# Patient Record
Sex: FEMALE | Race: OTHER | HISPANIC OR LATINO | Employment: UNEMPLOYED | URBAN - METROPOLITAN AREA
[De-identification: names, ages, dates, MRNs, and addresses within clinical notes are randomized per-mention and may not be internally consistent; named-entity substitution may affect disease eponyms.]

---

## 2022-04-10 ENCOUNTER — HISTORICAL (OUTPATIENT)
Dept: ADMINISTRATIVE | Facility: HOSPITAL | Age: 62
End: 2022-04-10
Payer: MEDICAID

## 2022-04-28 VITALS — WEIGHT: 136.69 LBS | BODY MASS INDEX: 26.84 KG/M2 | HEIGHT: 60 IN

## 2022-05-11 ENCOUNTER — CLINICAL SUPPORT (OUTPATIENT)
Dept: OPHTHALMOLOGY | Facility: CLINIC | Age: 62
End: 2022-05-11
Payer: MEDICAID

## 2022-05-11 VITALS — WEIGHT: 140 LBS | HEIGHT: 60 IN | BODY MASS INDEX: 27.48 KG/M2

## 2022-05-11 DIAGNOSIS — E11.311 DIABETIC MACULAR EDEMA OF BOTH EYES: Primary | ICD-10-CM

## 2022-05-11 PROCEDURE — 99213 OFFICE O/P EST LOW 20 MIN: CPT | Mod: PBBFAC,PO | Performed by: OPHTHALMOLOGY

## 2022-05-11 PROCEDURE — 67028 INJECTION EYE DRUG: CPT | Mod: PBBFAC,PO | Performed by: PHYSICIAN ASSISTANT

## 2022-05-11 PROCEDURE — 92134 CPTRZ OPH DX IMG PST SGM RTA: CPT | Mod: PBBFAC,PO | Performed by: OPHTHALMOLOGY

## 2022-05-11 RX ORDER — GLIPIZIDE 10 MG/1
10 TABLET ORAL 2 TIMES DAILY WITH MEALS
COMMUNITY
Start: 2022-03-17

## 2022-05-11 RX ORDER — EMPAGLIFLOZIN 25 MG/1
25 TABLET, FILM COATED ORAL DAILY
COMMUNITY
Start: 2022-01-03 | End: 2023-09-20

## 2022-05-11 RX ORDER — INSULIN GLARGINE 100 [IU]/ML
30 INJECTION, SOLUTION SUBCUTANEOUS DAILY
COMMUNITY
Start: 2021-09-28

## 2022-05-11 RX ORDER — PREGABALIN 75 MG/1
75 CAPSULE ORAL
COMMUNITY
Start: 2021-09-28 | End: 2023-09-20

## 2022-05-11 RX ORDER — CYCLOBENZAPRINE HCL 10 MG
10 TABLET ORAL
COMMUNITY
Start: 2021-09-28

## 2022-05-11 RX ORDER — DICYCLOMINE HYDROCHLORIDE 20 MG/1
20 TABLET ORAL
COMMUNITY
Start: 2021-09-28

## 2022-05-11 RX ORDER — METFORMIN HYDROCHLORIDE 1000 MG/1
TABLET ORAL
COMMUNITY
Start: 2022-04-19

## 2022-05-11 RX ORDER — GABAPENTIN 800 MG/1
800 TABLET ORAL 3 TIMES DAILY
COMMUNITY
Start: 2022-04-19

## 2022-05-11 RX ORDER — ATORVASTATIN CALCIUM 40 MG/1
40 TABLET, FILM COATED ORAL
COMMUNITY
Start: 2021-09-28

## 2022-05-11 RX ADMIN — Medication 1.25 MG: at 12:05

## 2022-05-11 RX ADMIN — BALANCED SALT SOLUTION 15 ML: 6.4; .75; .48; .3; 3.9; 1.7 SOLUTION OPHTHALMIC at 12:05

## 2022-05-11 NOTE — PROGRESS NOTES
HPI     YAIR OS      Additional comments: Patient states no changes with vision.           Last edited by Orlin Ashford MA on 5/11/2022 10:57 AM. (History)            Assessment /Plan     For exam results, see Encounter Report.    There are no diagnoses linked to this encounter.    OCT mac 5/11/22  OD: perifoveal macular edema, HEs, slight interval worsening of edema  OS: perifoveal macular edema, HEs, slight interval worsening of edema    1. PDR OS>OD with mac edema OD >OS  - seen by Dr. Hernández and referred here, recommend future IVFA  - per patient a1c 10, , DM2 for since early 2000s  - patient has poor transportation and is unable to go to  [facility]  - Patient poorly tolerant to discomfort from injections  - YAIR OS: 1st series of 3 completed 11/24/21  - YAIR OD: 1st series of 3 completed 12/15/21  - PRP OS: 12/29/21 (S/N performed) will need fill in  - PRP OD: 1/26/2022 (needs fill in)  - Some increase in OS DME 6 weeks s/p PRP  - Interval increase in OD DME 2 weeks s/p PRP  - Plan for 2nd series of 3 OU, will perform PRP when series of 3 completed or when ME clears. Perform series of 3 YAIR OU, coming back q2 weeks for injections, and at 3 injections, perform DFE OCT mac (~5/2022) every exam with YAIR vs PRP as plan  - YAIR OD #1 02/09/22, 3/23/22, 4/27/22  - YAIR OS #1 3/2/22, 4/6/22, 5/11/22  - here today 5/11/22 for YAIR OS (3rd in series of 3)  -RTC 2 weeks for YAIR OD (given worsening on OCT mac today)    2. Cataracts OU  - likely VS significant and inhibiting view for PRP  - Per Dr. Hernández attempt PRP prior to CEIOL to decrease risk of NVG  - CEIOL eval once some  OU, to improve view then more PRP OU as needed    RTC 2 weeks procedure day for YAIR OD      Avastin procedure note:  Procedure: Avastin (bevacizumab) intravitreal injection, left eye  Indication: Clinically significant macular edema, left eye    Risks, benefits, and alternatives of procedure were discussed.  Patient voiced understanding, all  questions were answered, and the patient elected to proceed with procedure.  Informed consent was obtained.     A time-out was performed confirming correct patient, procedure, and site. The eye was anesthetized with topical proparacaine, 0.5%. The lid margins and conjunctiva were sterilized with topical betadine. A sterile lid speculum was placed.  Avastin 1.25mg was injected intravitreally in the inferotemporal quadrant, 4 mm from the limbus. Vision was confirmed with finger counting at 2'. The eye was irrigated with sterile eye wash.  There were no complications.  The patient tolerated the procedure well. IOP measured with tonopen was 31mmHg after the procedure. The IOP was remeasured 10 minutes later and was 23mmHg. The patient was educated that mild irritation tonight was normal secondary to topical Betadine use.  Patient was informed to follow up immediately if any floaters, flashes, vision loss, severe pain, or other concerns arose.

## 2022-05-12 ENCOUNTER — TELEPHONE (OUTPATIENT)
Dept: OPHTHALMOLOGY | Facility: CLINIC | Age: 62
End: 2022-05-12
Payer: MEDICAID

## 2022-05-12 NOTE — TELEPHONE ENCOUNTER
Post procedure follow up call, Patient states she is doing well, verbalizes understanding of post procedure instructions. Aware to call clinic with any questions or concerns.

## 2022-06-22 ENCOUNTER — CLINICAL SUPPORT (OUTPATIENT)
Dept: OPHTHALMOLOGY | Facility: CLINIC | Age: 62
End: 2022-06-22
Payer: MEDICAID

## 2022-06-22 VITALS — HEIGHT: 60 IN | BODY MASS INDEX: 27.48 KG/M2 | WEIGHT: 140 LBS

## 2022-06-22 DIAGNOSIS — E11.311 DIABETIC MACULAR EDEMA OF BOTH EYES: Primary | ICD-10-CM

## 2022-06-22 PROCEDURE — 92134 CPTRZ OPH DX IMG PST SGM RTA: CPT | Mod: PBBFAC,PO | Performed by: STUDENT IN AN ORGANIZED HEALTH CARE EDUCATION/TRAINING PROGRAM

## 2022-06-22 PROCEDURE — 67028 INJECTION EYE DRUG: CPT | Mod: PBBFAC,RT,PO | Performed by: STUDENT IN AN ORGANIZED HEALTH CARE EDUCATION/TRAINING PROGRAM

## 2022-06-22 PROCEDURE — 99213 OFFICE O/P EST LOW 20 MIN: CPT | Mod: PBBFAC,PO,25 | Performed by: STUDENT IN AN ORGANIZED HEALTH CARE EDUCATION/TRAINING PROGRAM

## 2022-06-22 PROCEDURE — 92134 CPTRZ OPH DX IMG PST SGM RTA: CPT | Mod: 26,PBBFAC,PO | Performed by: STUDENT IN AN ORGANIZED HEALTH CARE EDUCATION/TRAINING PROGRAM

## 2022-06-22 RX ADMIN — Medication 1.25 MG: at 08:06

## 2022-06-22 RX ADMIN — BALANCED SALT SOLUTION 15 ML: 6.4; .75; .48; .3; 3.9; 1.7 SOLUTION OPHTHALMIC at 08:06

## 2022-06-22 NOTE — PROGRESS NOTES
HPI     PDR with mac edema       Additional comments: YAIR OD          Last edited by Orlin Ashford MA on 6/22/2022  7:45 AM. (History)        Assessment /Plan     For exam results, see Encounter Report.    Diabetic macular edema of both eyes    Other orders  -     bevacizumab (AVASTIN) 2.5 mg/0.10 mL injection 1.25 mg  -     balanced salt irrigation intra-ocular solution      OCT mac    05/2022    OD: perifoveal macular edema, HEs, slight interval worsening of edema    OS: perifoveal macular edema, HEs, slight interval worsening of edema   06/2022    OD: perifoveal and subfoveal macular edema, HEs, worsening of edema    OS: perifoveal macular edema, HEs, worsening of edema    1. PDR OS>OD with mac edema OD >OS  - seen by Dr. Hernández and referred here, recommend future IVFA  - per patient a1c 10, , DM2 for since early 2000s  - patient has poor transportation and is unable to [facility] to  [facility]  - Patient poorly tolerant to discomfort from injections  - YAIR OS: 1st series of 3 completed 11/24/21  - YAIR OD: 1st series of 3 completed 12/15/21  - PRP OS: 12/29/21 (S/N performed) will need fill in  - PRP OD: 1/26/2022 (needs fill in)  - Some increase in OS DME 6 weeks s/p PRP  - Interval increase in OD DME 2 weeks s/p PRP  - Plan for 2nd series of 3 OU, will perform PRP when series of 3 completed or when ME clears. Perform series of 3 YAIR OU, coming back q2 weeks for injections, and at 3 injections, perform DFE OCT mac (~5/2022) every exam with YAIR vs PRP as plan  - YAIR OD #1 02/09/22, 3/23/22, 4/27/22, 6/22/22  - YAIR OS #1 3/2/22, 4/6/22, 5/11/22  - here 6/22/22 for YAIR OD (4rd in series of 3 d/t worsening edema)  - RTC 2 weeks for YAIR OS #4 d/t increased edema and poor VA  - if edema/worsening and will need 5th shots, plan to switch to SHARON OU     2. Cataracts OU  - likely VS significant and inhibiting view for PRP  - Per Dr. Hernández attempt PRP prior to CEIOL to decrease risk of NVG  - CEIOL eval once some  OU,  to improve view then more PRP OU as needed    RTC 2 weeks procedure day for YAIR OS, OCT Mac, dfe      Avastin procedure note:  Procedure: Avastin (bevacizumab) intravitreal injection, right eye  Indication: Clinically significant macular edema, right eye    Risks, benefits, and alternatives of procedure were discussed.  Patient voiced understanding, all questions were answered, and the patient elected to proceed with procedure.  Informed consent was obtained.  A time-out was performed confirming correct patient, procedure, and site. The eye was anesthetized with topical proparacaine, 0.5%. The lid margins and conjunctiva were sterilized with topical betadine. A sterile lid speculum was placed.  Avastin 1.25mg was injected intravitreally in the inferotemporal quadrant, 4 mm from the limbus. Vision was confirmed with finger counting at 2'. The eye was irrigated with sterile eye wash.  There were no complications.  The patient was educated that mild irritation tonight was normal secondary to topical Betadine use.  Patient was informed to follow up immediately if any floaters, flashes, vision loss, severe pain, or other concerns arose.

## 2022-06-23 ENCOUNTER — TELEPHONE (OUTPATIENT)
Dept: OPHTHALMOLOGY | Facility: CLINIC | Age: 62
End: 2022-06-23
Payer: MEDICAID

## 2022-07-06 ENCOUNTER — CLINICAL SUPPORT (OUTPATIENT)
Dept: OPHTHALMOLOGY | Facility: CLINIC | Age: 62
End: 2022-07-06
Payer: MEDICAID

## 2022-07-06 VITALS — HEIGHT: 60 IN | WEIGHT: 140 LBS | BODY MASS INDEX: 27.48 KG/M2

## 2022-07-06 DIAGNOSIS — E11.3513 PROLIFERATIVE DIABETIC RETINOPATHY OF BOTH EYES WITH MACULAR EDEMA ASSOCIATED WITH TYPE 2 DIABETES MELLITUS: Primary | ICD-10-CM

## 2022-07-06 PROCEDURE — 92134 CPTRZ OPH DX IMG PST SGM RTA: CPT | Mod: PBBFAC,PO | Performed by: STUDENT IN AN ORGANIZED HEALTH CARE EDUCATION/TRAINING PROGRAM

## 2022-07-06 PROCEDURE — 99213 OFFICE O/P EST LOW 20 MIN: CPT | Mod: PBBFAC,PO | Performed by: STUDENT IN AN ORGANIZED HEALTH CARE EDUCATION/TRAINING PROGRAM

## 2022-07-06 NOTE — PROGRESS NOTES
HPI     YAIR OS ,Mac OU     Last edited by Simran Turner MA on 7/6/2022  8:31 AM. (History)    HPI     YAIR OS ,Mac OU     Last edited by Simran Turner MA on 7/6/2022  8:31 AM. (History)        Assessment /Plan     For exam results, see Encounter Report.    There are no diagnoses linked to this encounter.  OCT MAC 7/6/22  OD: 179, blunted foveal contour, worsening IRF temporal, improved IIRF inferiorly, diffuse HEs, photoreceptor loss  OS: 212, normal foveal contour, subfoveal HE, disruption of photo receptors, parafoveal IRF       1. PDR OS>OD with mac edema OD >OS  - seen by Dr. Hernández and referred here, recommend future IVFA  - per patient a1c 10, , DM2 for since early 2000s  - patient has poor transportation and is unable to [facility] to  [facility]  - Patient poorly tolerant to discomfort from injections  - YAIR OS: 1st series of 3 completed 11/24/21  - YAIR OD: 1st series of 3 completed 12/15/21  - PRP OS: 12/29/21 (S/N performed) will need fill in  - PRP OD: 1/26/2022 (needs fill in)  - Some increase in OS DME 6 weeks s/p PRP  - Interval increase in OD DME 2 weeks s/p PRP  - Plan for 2nd series of 3 OU, will perform PRP when series of 3 completed or when ME clears. Perform series of 3 YAIR OU, coming back q2 weeks for injections, and at 3 injections, perform DFE OCT mac (~5/2022) every exam with YAIR vs PRP as plan  - YAIR OD 02/09/22, 3/23/22, 4/27/22, 6/22/22  - YAIR OS 3/2/22, 4/6/22, 5/11/22  - Could switch to eylea OU if edema OD shows no improvement  - 7/6/22: Patient here for YAIR OS. OCT mac shows ME not involving fovea stable. VA stable. Patient would likely not benefit from YAIR OS at this time as vision is more limited by subfoveal HEs and loss of photoreceptors. Would likely benefit from IVFA and focal OS. Will have patient return in 1 week for Dr. Hernández evaluation.     2. Cataracts OU  - likely VS significant and inhibiting view for PRP  - Per Dr. Hernández attempt PRP prior to CEIOL to decrease risk of NVG  -  CEZHANE eval once some  OU, to improve view then more PRP OU as needed    RTC Dr. Hernández DFE and OCT mac, possible IV eylea and IVFA

## 2022-07-14 RX ORDER — TRAZODONE HYDROCHLORIDE 50 MG/1
50-100 TABLET ORAL NIGHTLY PRN
COMMUNITY
Start: 2022-05-16 | End: 2023-09-20

## 2022-07-14 RX ORDER — DICLOFENAC SODIUM 10 MG/G
2 GEL TOPICAL 4 TIMES DAILY
COMMUNITY
Start: 2022-05-16

## 2022-07-14 RX ORDER — MECLIZINE HCL 12.5 MG 12.5 MG/1
12.5 TABLET ORAL DAILY PRN
COMMUNITY
Start: 2022-05-16 | End: 2023-09-20

## 2022-07-14 RX ORDER — AMLODIPINE BESYLATE 5 MG/1
5 TABLET ORAL DAILY
COMMUNITY
Start: 2022-05-16 | End: 2024-02-08

## 2022-07-14 RX ORDER — OMEPRAZOLE 40 MG/1
40 CAPSULE, DELAYED RELEASE ORAL EVERY MORNING
COMMUNITY
Start: 2022-05-16 | End: 2023-09-20

## 2022-07-14 RX ORDER — PEN NEEDLE, DIABETIC 29 G X1/2"
NEEDLE, DISPOSABLE MISCELLANEOUS
COMMUNITY
Start: 2022-05-16

## 2022-07-15 ENCOUNTER — OFFICE VISIT (OUTPATIENT)
Dept: OPHTHALMOLOGY | Facility: CLINIC | Age: 62
End: 2022-07-15
Payer: MEDICAID

## 2022-07-15 VITALS — WEIGHT: 140 LBS | HEIGHT: 60 IN | BODY MASS INDEX: 27.48 KG/M2

## 2022-07-15 DIAGNOSIS — E11.3513 PROLIFERATIVE DIABETIC RETINOPATHY OF BOTH EYES WITH MACULAR EDEMA ASSOCIATED WITH TYPE 2 DIABETES MELLITUS: Primary | ICD-10-CM

## 2022-07-15 PROCEDURE — 67028 INJECTION EYE DRUG: CPT | Mod: PBBFAC,LT,PO | Performed by: STUDENT IN AN ORGANIZED HEALTH CARE EDUCATION/TRAINING PROGRAM

## 2022-07-15 PROCEDURE — 92134 CPTRZ OPH DX IMG PST SGM RTA: CPT | Mod: PBBFAC,PO | Performed by: STUDENT IN AN ORGANIZED HEALTH CARE EDUCATION/TRAINING PROGRAM

## 2022-07-15 PROCEDURE — 99213 OFFICE O/P EST LOW 20 MIN: CPT | Mod: PBBFAC,PO,25 | Performed by: STUDENT IN AN ORGANIZED HEALTH CARE EDUCATION/TRAINING PROGRAM

## 2022-07-15 RX ADMIN — BALANCED SALT SOLUTION 15 ML: 6.4; .75; .48; .3; 3.9; 1.7 SOLUTION OPHTHALMIC at 02:07

## 2022-07-15 RX ADMIN — AFLIBERCEPT 2 MG: 40 INJECTION, SOLUTION INTRAVITREAL at 02:07

## 2022-07-15 NOTE — PROGRESS NOTES
HPI     Diabetic Eye Exam      Additional comments: Evaluation with Dr. Carrera. PDR OS>OD with mac edema   OD >OS. OCT MAC and DFE.          Last edited by Orlin Ashford MA on 7/15/2022  1:01 PM. (History)    HPI     Diabetic Eye Exam      Additional comments: Evaluation with Dr. Carrera. PDR OS>OD with mac edema   OD >OS. OCT MAC and DFE.          Last edited by Orlin Ashford MA on 7/15/2022  1:01 PM. (History)        Assessment /Plan     For exam results, see Encounter Report.    There are no diagnoses linked to this encounter.  OCT MAC 7/15/22  OD: 523, blunted foveal contour, worsening IRF temporal, diffuse HEs, photoreceptor loss  OS: 383, blunted foveal contour, subfoveal HE, disruption of photo receptors, CIME worse than previous       1. stable PDR with mac edema OD >OS  - seen by Dr. Hernández and referred here, recommend future IVFA  - per patient a1c 10, , DM2 for since early 2000s  - patient has poor transportation and is unable to [facility] to  [facility]  - Patient poorly tolerant to discomfort from injections  - YAIR OS: 1st series of 3 completed 11/24/21  - YAIR OD: 1st series of 3 completed 12/15/21  - PRP OS: 12/29/21 (S/N performed) will need fill in  - PRP OD: 1/26/2022 (needs fill in)  - Some increase in OS DME 6 weeks s/p PRP  - Interval increase in OD DME 2 weeks s/p PRP  - Plan for 2nd series of 3 OU, will perform PRP when series of 3 completed or when ME clears. Perform series of 3 YAIR OU, coming back q2 weeks for injections, and at 3 injections, perform DFE OCT mac (~5/2022) every exam with YAIR vs PRP as plan  - YAIR OD 02/09/22, 3/23/22, 4/27/22, 6/22/22  - YAIR OS 3/2/22, 4/6/22, 5/11/22  - 7/6/22: Patient presented for YAIR OS, but based on stable VA and non CIME, YAIR was deferred. Sent to [facility] for evaluation of possible focal laser.   - 7/15/22: OCT mac shows worsening of ME involving fovea, decision made for intravitreal injection. Switched from YAIR to SHARON.  - SHARON OS 7/15/22: RBA discussed.  Patient understands risks and elects to proceed with SHARON OS. Consent signed. See procedure note below for details.    2. Cataracts OU  - likely VS significant and inhibiting view for PRP  - Per Dr. Hernández attempt PRP prior to CEIOL to decrease risk of NVG  - CEIOL eval once some  OU, to improve view then more PRP OU as needed      Procedure Note: Eylea Intravitreal injection of the left eye  Pre-and post-procedure diagnosis:  Macular edema    I have explained the risks, benefits and alternatives, of the procedure in detail. The patient voices understanding and all questions have been answered.  The patient agrees to proceed as planned. Signed consent was obtained, the site was marked, and a timeout was performed. Topical proparacaine was used for anesthesia. Topical Betadine was used for antisepsis. 0.05 cc Eylea injected 4 mm from corneal limbus @ inferotemporal position. The eye was then thoroughly irrigated with saline.  No complications were observed and patient tolerated procedure well and vision was confirmed to be count fingers or better after the injection.  The Patient was educated that mild irritation tonight was normal secondary to topical Betadine use, and was further educated that if significant pain or vision loss in occurred within 2-10 days, they should return immediately to see the ophthalmologist.

## 2022-07-27 ENCOUNTER — CLINICAL SUPPORT (OUTPATIENT)
Dept: OPHTHALMOLOGY | Facility: CLINIC | Age: 62
End: 2022-07-27
Payer: MEDICAID

## 2022-07-27 VITALS — HEIGHT: 60 IN | BODY MASS INDEX: 27.48 KG/M2 | WEIGHT: 140 LBS

## 2022-07-27 DIAGNOSIS — E11.311 DIABETIC MACULAR EDEMA OF BOTH EYES: ICD-10-CM

## 2022-07-27 DIAGNOSIS — E11.3513 PROLIFERATIVE DIABETIC RETINOPATHY OF BOTH EYES WITH MACULAR EDEMA ASSOCIATED WITH TYPE 2 DIABETES MELLITUS: Primary | ICD-10-CM

## 2022-07-27 PROCEDURE — 67028 INJECTION EYE DRUG: CPT | Mod: PBBFAC,RT,PO | Performed by: STUDENT IN AN ORGANIZED HEALTH CARE EDUCATION/TRAINING PROGRAM

## 2022-07-27 PROCEDURE — 99213 OFFICE O/P EST LOW 20 MIN: CPT | Mod: PBBFAC,PO,25 | Performed by: STUDENT IN AN ORGANIZED HEALTH CARE EDUCATION/TRAINING PROGRAM

## 2022-07-27 RX ADMIN — BALANCED SALT SOLUTION 15 ML: 6.4; .75; .48; .3; 3.9; 1.7 SOLUTION OPHTHALMIC at 09:07

## 2022-07-27 RX ADMIN — AFLIBERCEPT 2 MG: 40 INJECTION, SOLUTION INTRAVITREAL at 09:07

## 2022-07-27 NOTE — PROGRESS NOTES
HPI     Diabetic Eye Exam      Additional comments: SHARON.   Claudia states that she had subconjunctival hemorrhage OS after last   injection.           Last edited by Orlin Ashford MA on 7/27/2022  8:18 AM. (History)            Assessment /Plan     For exam results, see Encounter Report.    There are no diagnoses linked to this encounter.      OCT MAC 7/15/22  OD: 523, blunted foveal contour, worsening IRF temporal, diffuse HEs, photoreceptor loss  OS: 383, blunted foveal contour, subfoveal HE, disruption of photo receptors, CIME worse than previous     1. stable PDR with mac edema OD >OS  - seen by Dr. Hernández and referred here, recommend future IVFA  - per patient a1c 10, , DM2 for since early 2000s  - patient has poor transportation and is unable to [facility] to  [facility]  - Patient poorly tolerant to discomfort from injections  - YAIR OS: 1st series of 3 completed 11/24/21  - YAIR OD: 1st series of 3 completed 12/15/21  - PRP OS: 12/29/21 (S/N performed) will need fill in  - PRP OD: 1/26/2022 (needs fill in)  - Some increase in OS DME 6 weeks s/p PRP  - Interval increase in OD DME 2 weeks s/p PRP  - YAIR OD 02/09/22, 3/23/22, 4/27/22, 6/22/22  - YAIR OS 3/2/22, 4/6/22, 5/11/22, 7/15/22  - OCT mac 7/15/22:  shows worsening of ME involving fovea, decision made for intravitreal injection. Switched from YAIR to SHARON.  - Plan for SHARON OD 7/27/22. RBA discussed. Patient acknowledged understanding of risks and elects to proceed with SHARON OD. Consent signed, see procedure note below for details.  - RTC 2 weeks for DFE, OCT and possible SHARON OS. Once DME resolves, will need PRP OU.    2. Cataracts OU  - likely VS significant and inhibiting view for PRP  - Per Dr. Hernández attempt PRP prior to CEIOL to decrease risk of NVG  - CEIOL eval once some  OU, to improve view then more PRP OU as needed           Procedure Note: Eylea Intravitreal injection of the right eye  Pre-and post-procedure diagnosis:  Macular edema    I have  explained the risks, benefits and alternatives, of the procedure in detail. The patient voices understanding and all questions have been answered.  The patient agrees to proceed as planned. Signed consent was obtained, the site was marked, and a timeout was performed. Topical proparacaine was used for anesthesia. Topical Betadine was used for antisepsis. 0.05 cc Eylea injected 4 mm from corneal limbus @ inferotemporal position. The eye was then thoroughly irrigated with saline.  No complications were observed and patient tolerated procedure well and vision was confirmed to be count fingers or better after the injection.  The Patient was educated that mild irritation tonight was normal secondary to topical Betadine use, and was further educated that if significant pain or vision loss in occurred within 2-10 days, they should return immediately to see the ophthalmologist.

## 2022-07-28 ENCOUNTER — TELEPHONE (OUTPATIENT)
Dept: OPHTHALMOLOGY | Facility: CLINIC | Age: 62
End: 2022-07-28
Payer: MEDICAID

## 2022-07-28 NOTE — TELEPHONE ENCOUNTER
Post procedure follow up call,  Via  # [phone], No answer, message stated not available, try your call again later.

## 2022-08-10 ENCOUNTER — CLINICAL SUPPORT (OUTPATIENT)
Dept: OPHTHALMOLOGY | Facility: CLINIC | Age: 62
End: 2022-08-10
Payer: MEDICAID

## 2022-08-10 VITALS — BODY MASS INDEX: 27.48 KG/M2 | HEIGHT: 60 IN | WEIGHT: 140 LBS

## 2022-08-10 DIAGNOSIS — E11.3513 PROLIFERATIVE DIABETIC RETINOPATHY OF BOTH EYES WITH MACULAR EDEMA ASSOCIATED WITH TYPE 2 DIABETES MELLITUS: Primary | ICD-10-CM

## 2022-08-10 DIAGNOSIS — E11.311 DIABETIC MACULAR EDEMA OF BOTH EYES: ICD-10-CM

## 2022-08-10 PROCEDURE — 99213 OFFICE O/P EST LOW 20 MIN: CPT | Mod: PBBFAC,PO,25 | Performed by: STUDENT IN AN ORGANIZED HEALTH CARE EDUCATION/TRAINING PROGRAM

## 2022-08-10 PROCEDURE — 92134 CPTRZ OPH DX IMG PST SGM RTA: CPT | Mod: PBBFAC,PO | Performed by: OPHTHALMOLOGY

## 2022-08-10 PROCEDURE — 67028 INJECTION EYE DRUG: CPT | Mod: PBBFAC,PO | Performed by: STUDENT IN AN ORGANIZED HEALTH CARE EDUCATION/TRAINING PROGRAM

## 2022-08-10 RX ADMIN — AFLIBERCEPT 2 MG: 40 INJECTION, SOLUTION INTRAVITREAL at 08:08

## 2022-08-10 RX ADMIN — BALANCED SALT SOLUTION 15 ML: 6.4; .75; .48; .3; 3.9; 1.7 SOLUTION OPHTHALMIC at 08:08

## 2022-08-10 NOTE — PROGRESS NOTES
HPI     RTC DFE OCT and possible SHARON OS    Last edited by Simran Turner MA on 8/10/2022  8:05 AM. (History)    HPI     RTC DFE OCT and possible SHARON OS    Last edited by Simran Turner MA on 8/10/2022  8:05 AM. (History)            Assessment /Plan     For exam results, see Encounter Report.    There are no diagnoses linked to this encounter.      OCT MAC 8/10/22  OD: 452, blunted foveal contour, slightly improved IRF temporal, diffuse HEs, photoreceptor loss  OS: 243, blunted foveal contour, slightly improved superior IRF, subfoveal HE, photoreceptor loss     1. stable PDR with mac edema OD >OS  - seen by Dr. Hernández and referred here, recommend future IVFA  - per patient a1c 10, , DM2 for since early 2000s  - patient has poor transportation and is unable to go to  [facility]  - Patient poorly tolerant to discomfort from injections  - YAIR OS: 1st series of 3 completed 11/24/21  - YAIR OD: 1st series of 3 completed 12/15/21  - PRP OS: 12/29/21 (S/N performed) will need fill in  - PRP OD: 1/26/2022 (needs fill in)  - Some increase in OS DME 6 weeks s/p PRP  - Interval increase in OD DME 2 weeks s/p PRP  - YAIR OD 02/09/22, 3/23/22, 4/27/22, 6/22/22  - YAIR OS 3/2/22, 4/6/22, 5/11/22  - OCT mac 7/15/22:  shows worsening of ME involving fovea, decision made for intravitreal injection. Switched from YAIR to SHARON.  - SHARON OD: 7/27/22  - SHARON OS: 7/15/22, 8/10/22  - Plan for SHARON OS 8/10/22. RBA discussed. Patient acknowledged understanding of risks and elects to proceed with SHARON OS. Consent signed, see procedure note below for details.    2. Cataracts OU  - likely VS significant and inhibiting view for PRP  - Per Dr. Hernández attempt PRP prior to CEIOL to decrease risk of NVG  - CEIOL eval once some  OU, to improve view then more PRP OU as needed      RTC 2 weeks for OCT and possible SHARON OD. Once DME resolves, will need PRP OU.          Procedure Note: Eylea Intravitreal injection of the left eye  Pre-and post-procedure  diagnosis:  Macular edema    I have explained the risks, benefits and alternatives, of the procedure in detail. The patient voices understanding and all questions have been answered.  The patient agrees to proceed as planned. Signed consent was obtained, the site was marked, and a timeout was performed. Topical proparacaine was used for anesthesia. Topical Betadine was used for antisepsis. 0.05 cc Eylea injected 4 mm from corneal limbus @ inferotemporal position. The eye was then thoroughly irrigated with saline.  No complications were observed and patient tolerated procedure well and vision was confirmed to be count fingers or better after the injection.  The Patient was educated that mild irritation tonight was normal secondary to topical Betadine use, and was further educated that if significant pain or vision loss in occurred within 2-10 days, they should return immediately to see the ophthalmologist.

## 2022-08-11 ENCOUNTER — TELEPHONE (OUTPATIENT)
Dept: OPHTHALMOLOGY | Facility: CLINIC | Age: 62
End: 2022-08-11
Payer: MEDICAID

## 2022-08-24 ENCOUNTER — CLINICAL SUPPORT (OUTPATIENT)
Dept: OPHTHALMOLOGY | Facility: CLINIC | Age: 62
End: 2022-08-24
Payer: MEDICAID

## 2022-08-24 VITALS — HEIGHT: 60 IN | BODY MASS INDEX: 27.48 KG/M2 | WEIGHT: 140 LBS

## 2022-08-24 DIAGNOSIS — E11.3513 PROLIFERATIVE DIABETIC RETINOPATHY OF BOTH EYES WITH MACULAR EDEMA ASSOCIATED WITH TYPE 2 DIABETES MELLITUS: Primary | ICD-10-CM

## 2022-08-24 PROCEDURE — 67028 INJECTION EYE DRUG: CPT | Mod: PBBFAC,PO,RT,GC | Performed by: STUDENT IN AN ORGANIZED HEALTH CARE EDUCATION/TRAINING PROGRAM

## 2022-08-24 PROCEDURE — 99213 OFFICE O/P EST LOW 20 MIN: CPT | Mod: PBBFAC,PO | Performed by: STUDENT IN AN ORGANIZED HEALTH CARE EDUCATION/TRAINING PROGRAM

## 2022-08-24 PROCEDURE — 92134 CPTRZ OPH DX IMG PST SGM RTA: CPT | Mod: PBBFAC,PO | Performed by: OPHTHALMOLOGY

## 2022-08-24 RX ADMIN — BALANCED SALT SOLUTION 15 ML: 6.4; .75; .48; .3; 3.9; 1.7 SOLUTION OPHTHALMIC at 11:08

## 2022-08-24 RX ADMIN — AFLIBERCEPT 2 MG: 40 INJECTION, SOLUTION INTRAVITREAL at 11:08

## 2022-08-24 NOTE — PROGRESS NOTES
HPI     stable PDR with mac edema OD >OS      Additional comments: OCT and possible SHARON OD. Patient states that vision   seems a little better           Last edited by Orlin Ashford MA on 8/24/2022  9:23 AM. (History)            Assessment /Plan     For exam results, see Encounter Report.    There are no diagnoses linked to this encounter.        HPI     stable PDR with mac edema OD >OS      Additional comments: OCT and possible SHARON OD. Patient states that vision   seems a little better           Last edited by Orlin Ashford MA on 8/24/2022  9:23 AM. (History)    HPI     stable PDR with mac edema OD >OS      Additional comments: OCT and possible SHARON OD. Patient states that vision   seems a little better           Last edited by Orlin Ashford MA on 8/24/2022  9:23 AM. (History)            Assessment /Plan     For exam results, see Encounter Report.    There are no diagnoses linked to this encounter.      OCT MAC:  - 8/24/22:  OD: cmt 301, central DME extending IT, stable-appearing compared to last in 8/10/22.  OS: cmt 283, central DME associated with many exudates centrally extending temporally, appears stable.  - 8/10/22  OD: 452, blunted foveal contour, slightly improved IRF temporal, diffuse HEs, photoreceptor loss  OS: 243, blunted foveal contour, slightly improved superior IRF, subfoveal HE, photoreceptor loss      1. Stable PDR with mac edema OD >OS  - seen by Dr. Hernández and referred here, recommend future IVFA  - per patient a1c 10, , DM2 for since early 2000s  - patient has poor transportation and is unable to go to  [facility]  - Patient poorly tolerant to discomfort from injections  - YAIR OS: 1st series of 3 completed 11/24/21  - YAIR OD: 1st series of 3 completed 12/15/21  - PRP OS: 12/29/21 (S/N performed) will need fill in; Some increase in OS DME 6 weeks s/p PRP  - PRP OD: 1/26/2022 (needs fill in), Interval increase in OD DME 2 weeks s/p PRP  - YAIR OD 02/09/22, 3/23/22, 4/27/22, 6/22/22  -  YAIR OS 3/2/22, 4/6/22, 5/11/22  - OCT mac 7/15/22:  shows worsening of ME involving fovea, decision made for intravitreal injection. Switched from YAIR to SHARON.  - SHARON OD: 7/27/22, 8/24/22  - SHARON OS: 7/15/22, 8/10/22  - Plan for SHARON OD 8/24/22. RBA discussed. Patient acknowledged understanding of risks and elects to proceed with SHARON OD. Consent signed, see procedure note below for details.    2. Cataracts OU  - likely VS significant and inhibiting view for PRP  - Per Dr. Hernández attempt PRP prior to CEIOL to decrease risk of NVG  - CEIOL eval once some  OU, to improve view then more PRP OU as needed      RTC 2 weeks for OCT Mac and SHARON OS. Once DME resolves, will need more PRP OU.          Procedure Note: Eylea Intravitreal injection of the Right eye  Pre-and post-procedure diagnosis: Proliferative diabetic retinopathy with central involving Diabetic Macular edema    I have explained the risks, benefits and alternatives, of the procedure in detail. The patient voices understanding and all questions have been answered.  The patient agrees to proceed as planned. Signed consent was obtained, the site was marked, and a timeout was performed. Topical proparacaine was used for anesthesia. Topical Betadine was used for antisepsis. 0.05 cc Eylea injected 4 mm from corneal limbus @ inferotemporal position. The eye was then thoroughly irrigated with saline.  No complications were observed and patient tolerated procedure well and vision was confirmed to be count fingers or better after the injection.  The Patient was educated that mild irritation tonight was normal secondary to topical Betadine use, and was further educated that if significant pain or vision loss in occurred within 2-10 days, they should return immediately to see the ophthalmologist.

## 2022-08-25 ENCOUNTER — TELEPHONE (OUTPATIENT)
Dept: OPHTHALMOLOGY | Facility: CLINIC | Age: 62
End: 2022-08-25
Payer: MEDICAID

## 2022-09-07 ENCOUNTER — CLINICAL SUPPORT (OUTPATIENT)
Dept: OPHTHALMOLOGY | Facility: CLINIC | Age: 62
End: 2022-09-07
Payer: MEDICAID

## 2022-09-07 VITALS — BODY MASS INDEX: 27.48 KG/M2 | WEIGHT: 140 LBS | HEIGHT: 60 IN

## 2022-09-07 DIAGNOSIS — E11.3513 PROLIFERATIVE DIABETIC RETINOPATHY OF BOTH EYES WITH MACULAR EDEMA ASSOCIATED WITH TYPE 2 DIABETES MELLITUS: Primary | ICD-10-CM

## 2022-09-07 PROCEDURE — 92134 CPTRZ OPH DX IMG PST SGM RTA: CPT | Mod: PBBFAC,PO | Performed by: OPHTHALMOLOGY

## 2022-09-07 PROCEDURE — 99213 OFFICE O/P EST LOW 20 MIN: CPT | Mod: PBBFAC,PO | Performed by: STUDENT IN AN ORGANIZED HEALTH CARE EDUCATION/TRAINING PROGRAM

## 2022-09-07 PROCEDURE — 67028 INJECTION EYE DRUG: CPT | Mod: PBBFAC,PO,LT | Performed by: OPHTHALMOLOGY

## 2022-09-07 PROCEDURE — 96372 THER/PROPH/DIAG INJ SC/IM: CPT | Mod: PBBFAC,PO

## 2022-09-07 RX ADMIN — AFLIBERCEPT 2 MG: 40 INJECTION, SOLUTION INTRAVITREAL at 08:09

## 2022-09-07 RX ADMIN — BALANCED SALT SOLUTION 15 ML: 6.4; .75; .48; .3; 3.9; 1.7 SOLUTION OPHTHALMIC at 08:09

## 2022-09-07 NOTE — PROGRESS NOTES
HPI     PDR     Additional comments: OCT Mac and SHARON OS          Last edited by Orlin Ashford MA on 9/7/2022  7:45 AM.        Assessment /Plan     For exam results, see Encounter Report.    There are no diagnoses linked to this encounter.    OCT MAC:  -9/7/22:  OD: cmt 441, central DME extending IT, stable-appearing compared to last in 8/24/22.  OS: cmt 284, central DME associated with many exudates centrally extending temporally, appears stable.  - 8/24/22:  OD: cmt 301, central DME extending IT, stable-appearing compared to last in 8/10/22.  OS: cmt 283, central DME associated with many exudates centrally extending temporally, appears stable.  - 8/10/22  OD: 452, blunted foveal contour, slightly improved IRF temporal, diffuse HEs, photoreceptor loss  OS: 243, blunted foveal contour, slightly improved superior IRF, subfoveal HE, photoreceptor loss    1. Stable PDR with mac edema OD >OS  - seen by Dr. Hernández and referred here, recommend future IVFA  - per patient a1c 10, , DM2 for since early 2000s  - patient has poor transportation and is unable to go to  [facility]  - Patient poorly tolerant to discomfort from injections  - YAIR OS: 1st series of 3 completed 11/24/21  - YAIR OD: 1st series of 3 completed 12/15/21  - PRP OS: 12/29/21 (S/N performed) will need fill in; Some increase in OS DME 6 weeks s/p PRP  - PRP OD: 1/26/2022 (needs fill in), Interval increase in OD DME 2 weeks s/p PRP  - YAIR OD 02/09/22, 3/23/22, 4/27/22, 6/22/22  - YAIR OS 3/2/22, 4/6/22, 5/11/22  - OCT mac 7/15/22:  shows worsening of ME involving fovea, decision made for intravitreal injection. Switched from YAIR to SHARON.  - SHARON OD: 7/27/22, 8/24/22  - SHARON OS: 7/15/22, 8/10/22  - Plan for SHARON OS 8/24/22. RBA discussed. Patient acknowledged understanding of risks and elects to proceed with SHARON OS. Consent signed, see procedure note below for details.  - VA/edema stable on OCT despite switching to eylea. Will bring back in 2 weeks to complete  series of 3 in right eye (left eye completed today as above). Consider sending to [facility] following for further recs as edema mostly unchanged so far    2. Cataracts OU  - likely VS significant and inhibiting view for PRP  - Per Dr. Hernández attempt PRP prior to CEIOL to decrease risk of NVG  - CEIOL eval once some  OU, to improve view then more PRP OU as needed      RTC 2 weeks for OCT Mac and SHARON OD. Once DME resolves, will need more PRP OU.        Procedure Note: Eylea Intravitreal injection of the left eye  Pre-and post-procedure diagnosis: Proliferative diabetic retinopathy with central involving Diabetic Macular edema    I have explained the risks, benefits and alternatives, of the procedure in detail. The patient voices understanding and all questions have been answered.  The patient agrees to proceed as planned. Signed consent was obtained, the site was marked, and a timeout was performed. Topical proparacaine was used for anesthesia. Topical Betadine was used for antisepsis. 0.05 cc Eylea injected 4 mm from corneal limbus @ inferotemporal position. The eye was then thoroughly irrigated with saline.  No complications were observed and patient tolerated procedure well and vision was confirmed to be count fingers or better after the injection.  The Patient was educated that mild irritation tonight was normal secondary to topical Betadine use, and was further educated that if significant pain or vision loss in occurred within 2-10 days, they should return immediately to see the ophthalmologist.

## 2022-09-08 ENCOUNTER — TELEPHONE (OUTPATIENT)
Dept: OPHTHALMOLOGY | Facility: CLINIC | Age: 62
End: 2022-09-08
Payer: MEDICAID

## 2022-09-28 ENCOUNTER — CLINICAL SUPPORT (OUTPATIENT)
Dept: OPHTHALMOLOGY | Facility: CLINIC | Age: 62
End: 2022-09-28
Payer: MEDICAID

## 2022-09-28 VITALS — BODY MASS INDEX: 27.48 KG/M2 | HEIGHT: 60 IN | WEIGHT: 140 LBS

## 2022-09-28 DIAGNOSIS — E11.3513 PROLIFERATIVE DIABETIC RETINOPATHY OF BOTH EYES WITH MACULAR EDEMA ASSOCIATED WITH TYPE 2 DIABETES MELLITUS: Primary | ICD-10-CM

## 2022-09-28 DIAGNOSIS — E11.311 DIABETIC MACULAR EDEMA OF BOTH EYES: ICD-10-CM

## 2022-09-28 PROCEDURE — 99213 OFFICE O/P EST LOW 20 MIN: CPT | Mod: PBBFAC,PO,25

## 2022-09-28 PROCEDURE — 67028 INJECTION EYE DRUG: CPT | Mod: PBBFAC,PO,RT,GC | Performed by: STUDENT IN AN ORGANIZED HEALTH CARE EDUCATION/TRAINING PROGRAM

## 2022-09-28 RX ADMIN — AFLIBERCEPT 2 MG: 40 INJECTION, SOLUTION INTRAVITREAL at 09:09

## 2022-09-28 RX ADMIN — BALANCED SALT SOLUTION 15 ML: 6.4; .75; .48; .3; 3.9; 1.7 SOLUTION OPHTHALMIC at 09:09

## 2022-09-28 NOTE — PROGRESS NOTES
HPI     PDR with mac edema      Additional comments: OCT Mac and SHARON OD          Last edited by Orlin Ashford MA on 9/28/2022  9:15 AM.        Assessment /Plan     For exam results, see Encounter Report.    Proliferative diabetic retinopathy of both eyes with macular edema associated with type 2 diabetes mellitus    Diabetic macular edema of both eyes      OCT MAC:  - 9/7/22:  OD: cmt 441, central DME extending IT, stable-appearing compared to last in 8/24/22.  OS: cmt 284, central DME associated with many exudates centrally extending temporally, appears stable.  - 8/24/22:  OD: cmt 301, central DME extending IT, stable-appearing compared to last in 8/10/22.  OS: cmt 283, central DME associated with many exudates centrally extending temporally, appears stable.  - 8/10/22  OD: 452, blunted foveal contour, slightly improved IRF temporal, diffuse HEs, photoreceptor loss  OS: 243, blunted foveal contour, slightly improved superior IRF, subfoveal HE, photoreceptor loss    1. Stable PDR with mac edema OD>OS  - seen by Dr. Hernández and referred here, recommend future IVFA  - Last A1c in Chart:No results found for: HGBA1C    - per patient a1c ~10, , DM2 for since early 2000s  - patient has poor transportation and is unable to go to  [facility]  - Patient poorly tolerant to discomfort from injections  - YAIR OS: 1st series of 3 completed 11/24/21  - YAIR OD: 1st series of 3 completed 12/15/21  - PRP OS: 12/29/21 (S/N performed) will need fill in; Some increase in OS DME 6 weeks s/p PRP  - PRP OD: 1/26/2022 (needs fill in), Interval increase in OD DME 2 weeks s/p PRP  - Most recent YAIR OD: 3/23/22, 4/27/22, 6/22/22  - Most recent YAIR OS: 3/2/22, 4/6/22, 5/11/22  - OCT mac 7/15/22 with interval worsening on YAIR: Switched from YAIR to SHARON.  - Most recent SHARON OD: 7/27/22, 8/24/22, 9/28/22 (today)  - Most recent SHARON OS: 7/15/22, 8/10/22, 9/7/22  - Plan for SHARON OD 9/28/22, rba discussed and consent signed after questions  answered.    2. Cataracts OU  - likely VS significant and inhibiting view for PRP  - Per Dr. Hernández attempt PRP prior to CEIOL to decrease risk of NVG  - CEIOL eval once some  OU, to improve view then more PRP OU as needed        RTC 2 weeks for DFE, OCT Mac. If DME resolved, can pursue PRP OU (pending DFE exam, which eye to do first). After PRP OU, can evaluate CEIOL OU.        Procedure Note: Eylea Intravitreal injection of the Right eye  Pre-and post-procedure diagnosis: Proliferative diabetic retinopathy with central involving Diabetic Macular edema    I have explained the risks, benefits and alternatives, of the procedure in detail. The patient voices understanding and all questions have been answered.  The patient agrees to proceed as planned. Signed consent was obtained, the site was marked, and a timeout was performed. Topical proparacaine was used for anesthesia. Topical Betadine was used for antisepsis. 0.05 cc Eylea injected 4 mm from corneal limbus @ inferotemporal position. The eye was then thoroughly irrigated with saline.  No complications were observed and patient tolerated procedure well and vision was confirmed to be count fingers or better after the injection.  The Patient was educated that mild irritation tonight was normal secondary to topical Betadine use, and was further educated that if significant pain or vision loss in occurred within 2-10 days, they should return immediately to see the ophthalmologist.          HPI     PDR with mac edema      Additional comments: OCT Mac and SHARON OD          Last edited by Orlin Ashford MA on 9/28/2022  9:15 AM.            Assessment /Plan     For exam results, see Encounter Report.    Proliferative diabetic retinopathy of both eyes with macular edema associated with type 2 diabetes mellitus    Diabetic macular edema of both eyes

## 2022-09-29 ENCOUNTER — TELEPHONE (OUTPATIENT)
Dept: OPHTHALMOLOGY | Facility: CLINIC | Age: 62
End: 2022-09-29
Payer: MEDICAID

## 2022-11-16 ENCOUNTER — CLINICAL SUPPORT (OUTPATIENT)
Dept: OPHTHALMOLOGY | Facility: CLINIC | Age: 62
End: 2022-11-16
Payer: MEDICAID

## 2022-11-16 VITALS — HEIGHT: 60 IN | BODY MASS INDEX: 27.48 KG/M2 | WEIGHT: 140 LBS

## 2022-11-16 DIAGNOSIS — E11.311 DIABETIC MACULAR EDEMA OF BOTH EYES: ICD-10-CM

## 2022-11-16 DIAGNOSIS — E11.3513 PROLIFERATIVE DIABETIC RETINOPATHY OF BOTH EYES WITH MACULAR EDEMA ASSOCIATED WITH TYPE 2 DIABETES MELLITUS: Primary | ICD-10-CM

## 2022-11-16 PROCEDURE — 92134 CPTRZ OPH DX IMG PST SGM RTA: CPT | Mod: PBBFAC,PO | Performed by: STUDENT IN AN ORGANIZED HEALTH CARE EDUCATION/TRAINING PROGRAM

## 2022-11-16 PROCEDURE — 67028 INJECTION EYE DRUG: CPT | Mod: PBBFAC,PO,LT | Performed by: STUDENT IN AN ORGANIZED HEALTH CARE EDUCATION/TRAINING PROGRAM

## 2022-11-16 PROCEDURE — 92134 CPTRZ OPH DX IMG PST SGM RTA: CPT | Mod: PBBFAC,PO | Performed by: OPHTHALMOLOGY

## 2022-11-16 PROCEDURE — 99213 OFFICE O/P EST LOW 20 MIN: CPT | Mod: PBBFAC,PO,25 | Performed by: STUDENT IN AN ORGANIZED HEALTH CARE EDUCATION/TRAINING PROGRAM

## 2022-11-16 RX ORDER — ESCITALOPRAM OXALATE 10 MG/1
10 TABLET ORAL DAILY
COMMUNITY
Start: 2022-11-07

## 2022-11-16 RX ORDER — PEN NEEDLE, DIABETIC 29 G X1/2"
NEEDLE, DISPOSABLE MISCELLANEOUS DAILY
COMMUNITY
Start: 2022-09-13

## 2022-11-16 RX ADMIN — AFLIBERCEPT 2 MG: 40 INJECTION, SOLUTION INTRAVITREAL at 12:11

## 2022-11-16 RX ADMIN — BALANCED SALT SOLUTION 15 ML: 6.4; .75; .48; .3; 3.9; 1.7 SOLUTION OPHTHALMIC at 12:11

## 2022-11-16 NOTE — PROGRESS NOTES
HPI     Diabetic Retinopathy     Additional comments: OCT MAC, DFE, poss PRP. Patient states that she   missed last appointment because she was out of town and vision seem much   more blurry OU and sharp pain OU that comes and goes.           Last edited by Orlin Ashford MA on 11/16/2022 11:34 AM.            Assessment /Plan     For exam results, see Encounter Report.    Proliferative diabetic retinopathy of both eyes with macular edema associated with type 2 diabetes mellitus    Diabetic macular edema of both eyes    Other orders  -     aflibercept Soln 2 mg  -     balanced salt irrigation intra-ocular solution               OCT MAC:  -11/16/22  OD: 385; central DME extending IT, worsened slightly from prior  OS: 523: central DME with dense exudates, large central vacuoles; worsened considerably from prior  - 9/7/22:  OD: cmt 441, central DME extending IT, stable-appearing compared to last in 8/24/22.  OS: cmt 284, central DME associated with many exudates centrally extending temporally, appears stable.       1. Stable PDR with mac edema OD>OS  - seen by Dr. Hernández and referred here, recommend future IVFA  - Last A1c in Chart:No results found for: HGBA1C               - per patient a1c ~10, , DM2 for since early 2000s  - patient has poor transportation and is unable to go to  [facility]  - Patient poorly tolerant to discomfort from injections  - YAIR OS: 1st series of 3 completed 11/24/21  - YAIR OD: 1st series of 3 completed 12/15/21  - PRP OS: 12/29/21 (S/N performed) will need fill in; Some increase in OS DME 6 weeks s/p PRP  - PRP OD: 1/26/2022 (needs fill in), Interval increase in OD DME 2 weeks s/p PRP  - Most recent YAIR OD: 3/23/22, 4/27/22, 6/22/22  - Most recent YAIR OS: 3/2/22, 4/6/22, 5/11/22  - OCT mac 7/15/22 with interval worsening on YAIR: Switched from YAIR to SHARON.  - Most recent SHARON OD: 7/27/22, 8/24/22, 9/28/22  - Most recent SHARON OS: 7/15/22, 8/10/22, 9/7/22, 11/16/22(today), RBA discussed  Plan  as below    2. Cataracts OU  - likely VS significant and inhibiting view for PRP  - Per Dr. Hernández attempt PRP prior to CEIOL to decrease risk of NVG  - CEIOL eval once some  OU, to improve view then more PRP OU as needed           RTC 2 weeks for SHARON OD, then bring back ASAP (3 weeks from 11/16/22) for PRP OS/OD (either)        ----------------------------------------------------------------------------------------------------  Procedure Note: Eylea Intravitreal injection of the LEFT eye  Pre-and post-procedure diagnosis: Proliferative diabetic retinopathy with central involving Diabetic Macular edema     I have explained the risks, benefits and alternatives, of the procedure in detail. The patient voices understanding and all questions have been answered.  The patient agrees to proceed as planned. Signed consent was obtained, the site was marked, and a timeout was performed. Topical proparacaine was used for anesthesia. Topical Betadine was used for antisepsis. 0.05 cc Eylea injected 4 mm from corneal limbus @ inferotemporal position. The eye was then thoroughly irrigated with saline.  No complications were observed and patient tolerated procedure well and vision was confirmed to be count fingers or better after the injection.  The Patient was educated that mild irritation tonight was normal secondary to topical Betadine use, and was further educated that if significant pain or vision loss in occurred within 2-10 days, they should return immediately to see the ophthalmologist.

## 2022-11-17 ENCOUNTER — TELEPHONE (OUTPATIENT)
Dept: OPHTHALMOLOGY | Facility: CLINIC | Age: 62
End: 2022-11-17
Payer: MEDICAID

## 2022-11-30 ENCOUNTER — CLINICAL SUPPORT (OUTPATIENT)
Dept: OPHTHALMOLOGY | Facility: CLINIC | Age: 62
End: 2022-11-30
Payer: MEDICAID

## 2022-11-30 VITALS — HEIGHT: 60 IN | WEIGHT: 140 LBS | BODY MASS INDEX: 27.48 KG/M2

## 2022-11-30 DIAGNOSIS — E11.311 DIABETIC MACULAR EDEMA OF BOTH EYES: ICD-10-CM

## 2022-11-30 DIAGNOSIS — E11.3513 PROLIFERATIVE DIABETIC RETINOPATHY OF BOTH EYES WITH MACULAR EDEMA ASSOCIATED WITH TYPE 2 DIABETES MELLITUS: Primary | ICD-10-CM

## 2022-11-30 PROCEDURE — 99213 OFFICE O/P EST LOW 20 MIN: CPT | Mod: PBBFAC,PO | Performed by: STUDENT IN AN ORGANIZED HEALTH CARE EDUCATION/TRAINING PROGRAM

## 2022-11-30 PROCEDURE — 67028 INJECTION EYE DRUG: CPT | Mod: PBBFAC,PO | Performed by: STUDENT IN AN ORGANIZED HEALTH CARE EDUCATION/TRAINING PROGRAM

## 2022-11-30 RX ADMIN — AFLIBERCEPT 2 MG: 40 INJECTION, SOLUTION INTRAVITREAL at 11:11

## 2022-11-30 RX ADMIN — BALANCED SALT SOLUTION 15 ML: 6.4; .75; .48; .3; 3.9; 1.7 SOLUTION OPHTHALMIC at 11:11

## 2022-11-30 NOTE — PROGRESS NOTES
HPI    SHARON OD  Last edited by Simran Turner MA on 11/30/2022  9:52 AM.            Assessment /Plan     For exam results, see Encounter Report.    There are no diagnoses linked to this encounter.               OCT MAC:  -11/16/22  OD: 385; central DME extending IT, worsened slightly from prior  OS: 523: central DME with dense exudates, large central vacuoles; worsened considerably from prior  - 9/7/22:  OD: cmt 441, central DME extending IT, stable-appearing compared to last in 8/24/22.  OS: cmt 284, central DME associated with many exudates centrally extending temporally, appears stable.       1. Stable PDR with mac edema OD>OS  - seen by Dr. Hernández and referred here, recommend future IVFA  - Last A1c in Chart:No results found for: HGBA1C               - per patient a1c ~10, , DM2 for since early 2000s  - patient has poor transportation and is unable to go to  [facility]  - Patient poorly tolerant to discomfort from injections  - YAIR OS: 1st series of 3 completed 11/24/21  - YAIR OD: 1st series of 3 completed 12/15/21  - PRP OS: 12/29/21 (S/N performed) will need fill in; Some increase in OS DME 6 weeks s/p PRP  - PRP OD: 1/26/2022 (needs fill in), Interval increase in OD DME 2 weeks s/p PRP  - Most recent YAIR OD: 3/23/22, 4/27/22, 6/22/22  - Most recent YAIR OS: 3/2/22, 4/6/22, 5/11/22  - OCT mac 7/15/22 with interval worsening on YAIR: Switched from YAIR to SHARON.  - Most recent SHARON OD: 7/27/22, 8/24/22, 9/28/22, 11/30/22   - Most recent SHARON OS: 7/15/22, 8/10/22, 9/7/22, 11/16/22  - RTC 2 weeks OCT Mac, DFE, PRP either eye - consider CEIOL after PRP fill     2. Cataracts OU  - likely VS significant and inhibiting view for PRP  - Per Dr. Hernández attempt PRP prior to CEIOL to decrease risk of NVG  - CEIOL eval once some  OU, to improve view then more PRP OU as needed           RTC 2 weeks OCT Mac, DFE, PRP either eye          ----------------------------------------------------------------------------------------------------  Procedure Note: Eylea Intravitreal injection of the right eye  Pre-and post-procedure diagnosis: Proliferative diabetic retinopathy with central involving Diabetic Macular edema     I have explained the risks, benefits and alternatives, of the procedure in detail. The patient voices understanding and all questions have been answered.  The patient agrees to proceed as planned. Signed consent was obtained, the site was marked, and a timeout was performed. Topical proparacaine was used for anesthesia. Topical Betadine was used for antisepsis. 0.05 cc Eylea injected 4 mm from corneal limbus @ inferotemporal position. The eye was then thoroughly irrigated with saline.  No complications were observed and patient tolerated procedure well and vision was confirmed to be count fingers or better after the injection.  The Patient was educated that mild irritation tonight was normal secondary to topical Betadine use, and was further educated that if significant pain or vision loss in occurred within 2-10 days, they should return immediately to see the ophthalmologist.

## 2022-12-01 ENCOUNTER — TELEPHONE (OUTPATIENT)
Dept: OPHTHALMOLOGY | Facility: CLINIC | Age: 62
End: 2022-12-01
Payer: MEDICAID

## 2022-12-14 ENCOUNTER — CLINICAL SUPPORT (OUTPATIENT)
Dept: OPHTHALMOLOGY | Facility: CLINIC | Age: 62
End: 2022-12-14
Payer: MEDICAID

## 2022-12-14 VITALS — WEIGHT: 140 LBS | BODY MASS INDEX: 27.48 KG/M2 | HEIGHT: 60 IN

## 2022-12-14 DIAGNOSIS — E11.3513 PROLIFERATIVE DIABETIC RETINOPATHY OF BOTH EYES WITH MACULAR EDEMA ASSOCIATED WITH TYPE 2 DIABETES MELLITUS: Primary | ICD-10-CM

## 2022-12-14 PROCEDURE — 67210 TREATMENT OF RETINAL LESION: CPT | Mod: PBBFAC,PO | Performed by: STUDENT IN AN ORGANIZED HEALTH CARE EDUCATION/TRAINING PROGRAM

## 2022-12-14 PROCEDURE — 99213 OFFICE O/P EST LOW 20 MIN: CPT | Mod: PBBFAC,PO | Performed by: STUDENT IN AN ORGANIZED HEALTH CARE EDUCATION/TRAINING PROGRAM

## 2022-12-14 RX ORDER — SULFAMETHOXAZOLE AND TRIMETHOPRIM 800; 160 MG/1; MG/1
1 TABLET ORAL 2 TIMES DAILY
COMMUNITY
Start: 2022-12-13 | End: 2023-09-18 | Stop reason: ALTCHOICE

## 2022-12-14 RX ORDER — MUPIROCIN 20 MG/G
OINTMENT TOPICAL 2 TIMES DAILY
COMMUNITY
Start: 2022-12-13 | End: 2023-09-20

## 2022-12-14 NOTE — PROGRESS NOTES
HPI     Stable PDR with mac edema OD>OS     Additional comments: OCT Mac, DFE, PRP either eye           Last edited by Orlin Ashford MA on 12/14/2022  8:34 AM.            Assessment /Plan     For exam results, see Encounter Report.    There are no diagnoses linked to this encounter.               OCT MAC:  -11/16/22  OD: 385; central DME extending IT, worsened slightly from prior  OS: 523: central DME with dense exudates, large central vacuoles; worsened considerably from prior  - 9/7/22:  OD: cmt 441, central DME extending IT, stable-appearing compared to last in 8/24/22.  OS: cmt 284, central DME associated with many exudates centrally extending temporally, appears stable.       1. Stable PDR with mac edema OD>OS  - seen by Dr. Hernández and referred here, recommend future IVFA  - Last A1c in Chart:No results found for: HGBA1C               - per patient a1c ~10, , DM2 for since early 2000s  - patient has poor transportation and is unable to go to  [facility]  - Patient poorly tolerant to discomfort from injections  - YAIR OS: 1st series of 3 completed 11/24/21  - YAIR OD: 1st series of 3 completed 12/15/21  - PRP OS: 12/29/21   - PRP OD: 1/26/2022, 12/14/22   - Most recent YAIR OD: 3/23/22, 4/27/22, 6/22/22  - Most recent YAIR OS: 3/2/22, 4/6/22, 5/11/22  - OCT mac 7/15/22 with interval worsening on YAIR: Switched from YAIR to SHARON.  - Most recent SHARON OD: 7/27/22, 8/24/22, 9/28/22, 11/30/22   - Most recent SHARON OS: 7/15/22, 8/10/22, 9/7/22, 11/16/22  - PRP fill in done 12/14/22 OD see below   - Will bring back in 2 weeks of possible SHARON either eye vs PRP fill in OS     2. Cataracts OU  - likely VS significant and inhibiting view for PRP  - Per Dr. Hernández attempt PRP prior to CEIOL to decrease risk of NVG  - CEIOL eval once some  OU, to improve view then more PRP OU as needed           RTC 2 weeks OCT Mac, DFE, possible PRP vs injection       PROCEDURE NOTE:  Procedure: Panretinal photocoagulation, right  eye  Indication: Proliferative diabetic retinopathy, right eye  Procedure:   Risks, benefits, and alternatives were discussed.  All questions were answered.  The patient voiced understanding and wished to proceed.  Informed consent was obtained.  A time out was performed to confirm correct patient, procedure, and site.  Topical anesthesia was instilled in the eye to be treated with proparacaine.  Laser details as below:    Laser: [id number]   Power: 200-260    Duration: 150   Number: [id number] mostly superiorly, with some nasally and temporally     Pt tolerated procedure well without complications.  Pt was given a follow up appointment and given anticipatory guidance regarding potential complications

## 2022-12-15 ENCOUNTER — TELEPHONE (OUTPATIENT)
Dept: OPHTHALMOLOGY | Facility: CLINIC | Age: 62
End: 2022-12-15
Payer: MEDICAID

## 2023-01-04 ENCOUNTER — CLINICAL SUPPORT (OUTPATIENT)
Dept: OPHTHALMOLOGY | Facility: CLINIC | Age: 63
End: 2023-01-04
Payer: MEDICAID

## 2023-01-04 VITALS — BODY MASS INDEX: 27.48 KG/M2 | HEIGHT: 60 IN | WEIGHT: 140 LBS

## 2023-01-04 DIAGNOSIS — E11.3513 PROLIFERATIVE DIABETIC RETINOPATHY OF BOTH EYES WITH MACULAR EDEMA ASSOCIATED WITH TYPE 2 DIABETES MELLITUS: Primary | ICD-10-CM

## 2023-01-04 PROCEDURE — 92134 CPTRZ OPH DX IMG PST SGM RTA: CPT | Mod: PBBFAC,PO | Performed by: STUDENT IN AN ORGANIZED HEALTH CARE EDUCATION/TRAINING PROGRAM

## 2023-01-04 PROCEDURE — 99213 OFFICE O/P EST LOW 20 MIN: CPT | Mod: PBBFAC,PO | Performed by: STUDENT IN AN ORGANIZED HEALTH CARE EDUCATION/TRAINING PROGRAM

## 2023-01-04 PROCEDURE — 67228 TREATMENT X10SV RETINOPATHY: CPT | Mod: PBBFAC,PO | Performed by: STUDENT IN AN ORGANIZED HEALTH CARE EDUCATION/TRAINING PROGRAM

## 2023-01-04 NOTE — PROGRESS NOTES
HPI     Proliferative diabetic retinopathy      Additional comments: OCT Mac, DFE, possible PRP vs injection            Comments    Proliferative diabetic retinopathy of both eyes with macular edema   associated with type 2 diabetes mellitus- OCT Mac, DFE, possible PRP vs   injection           Last edited by Orlin Ashford MA on 1/4/2023  8:05 AM.            Assessment /Plan     For exam results, see Encounter Report.    There are no diagnoses linked to this encounter.               OCT MAC:  -11/16/22  OD: 385; central DME extending IT, worsened slightly from prior  OS: 523: central DME with dense exudates, large central vacuoles; worsened considerably from prior  - 9/7/22:  OD: cmt 441, central DME extending IT, stable-appearing compared to last in 8/24/22.  OS: cmt 284, central DME associated with many exudates centrally extending temporally, appears stable.       1. Stable PDR with mac edema OD>OS  - seen by Dr. Hernández and referred here, recommend future IVFA  - Last A1c in Chart:No results found for: HGBA1C               - per patient a1c ~10, , DM2 for since early 2000s  - patient has poor transportation and is unable to go to  [facility]  - Patient poorly tolerant to discomfort from injections  - YAIR OS: 1st series of 3 completed 11/24/21  - YAIR OD: 1st series of 3 completed 12/15/21  - PRP OS: 12/29/21, 1/4/23   - PRP OD: 1/26/2022, 12/14/22   - Most recent YAIR OD: 3/23/22, 4/27/22, 6/22/22  - Most recent YAIR OS: 3/2/22, 4/6/22, 5/11/22  - OCT mac 7/15/22 with interval worsening on YAIR: Switched from YAIR to SHARON.  - Most recent SHARON OD: 7/27/22, 8/24/22, 9/28/22, 11/30/22   - Most recent SHARON OS: 7/15/22, 8/10/22, 9/7/22, 11/16/22  - 1/4/23 - PRP fill in done OS. Cataracts do not appear visually significant. Looking back at OCTs in 9/2022, VA 20/200 when fovea dry OS which is the same VA as today with edema. Likely visually significant hard exudates. Will bring back to Dr. Hernández to discuss if continued SHARON  would be beneficial in either eye or if the patient should be observed off of injections given poor improvement of VA in the past.     2. Cataracts OU  - Do not appear visually significant                RTC [place] February              PROCEDURE NOTE:  Procedure: Panretinal photocoagulation, left eye  Indication: Proliferative diabetic retinopathy, left eye  Procedure:   Risks, benefits, and alternatives were discussed.  All questions were answered.  The patient voiced understanding and wished to proceed.  Informed consent was obtained.  A time out was performed to confirm correct patient, procedure, and site.  Topical anesthesia was instilled in the eye to be treated with proparacaine.  Laser details as below:    Laser: [id number]   Power: 200-250    Duration: 155   Number: [id number] treated in all areas     Pt tolerated procedure well without complications.  Pt was given a follow up appointment and given anticipatory guidance regarding potential complications

## 2023-02-17 ENCOUNTER — OFFICE VISIT (OUTPATIENT)
Dept: OPHTHALMOLOGY | Facility: CLINIC | Age: 63
End: 2023-02-17
Payer: MEDICAID

## 2023-02-17 VITALS — HEIGHT: 60 IN | BODY MASS INDEX: 27.48 KG/M2 | WEIGHT: 140 LBS

## 2023-02-17 DIAGNOSIS — E11.3513 PROLIFERATIVE DIABETIC RETINOPATHY OF BOTH EYES WITH MACULAR EDEMA ASSOCIATED WITH TYPE 2 DIABETES MELLITUS: Primary | ICD-10-CM

## 2023-02-17 PROCEDURE — 92134 CPTRZ OPH DX IMG PST SGM RTA: CPT | Mod: PBBFAC,PO | Performed by: INTERNAL MEDICINE

## 2023-02-17 PROCEDURE — 99213 OFFICE O/P EST LOW 20 MIN: CPT | Mod: PBBFAC,PO | Performed by: INTERNAL MEDICINE

## 2023-02-17 NOTE — PROGRESS NOTES
HPI    Dr. Hernández  Last edited by Simran Turner MA on 2/17/2023  1:35 PM.            Assessment /Plan     For exam results, see Encounter Report.    There are no diagnoses linked to this encounter.               OCT MAC (2/17/23)  OD: 465; central DME extending IT, similar to prior  OS: 617: central DME with dense exudates, large central vacuoles; worsened from prior     1. Stable PDR with mac edema OU  - seen by Dr. Hernández and referred here, recommend future IVFA  - Last A1c in Chart:No results found for: HGBA1C               - per patient a1c ~9, , DM2 for since early 2000s  - patient has poor transportation and is unable to go to  [facility]  - Patient poorly tolerant to discomfort from injections  - YAIR OS: 1st series of 3 completed 11/24/21  - YAIR OD: 1st series of 3 completed 12/15/21  - PRP OS: 12/29/21, 1/4/23   - PRP OD: 1/26/2022, 12/14/22   - Most recent YAIR OD: 3/23/22, 4/27/22, 6/22/22  - Most recent YAIR OS: 3/2/22, 4/6/22, 5/11/22  - OCT mac 7/15/22 with interval worsening on YAIR: Switched from YAIR to SHARON.  - Most recent SHARON OD: 7/27/22, 8/24/22, 9/28/22, 11/30/22   - Most recent SHARON OS: 7/15/22, 8/10/22, 9/7/22, 11/16/22  - 1/4/23 - PRP fill in done OS. Cataracts do not appear visually significant. Looking back at OCTs in 9/2022, VA 20/200 when fovea dry OS which is the same VA as today with edema. Likely visually significant hard exudates. Will bring back to Dr. Hernández to discuss if continued SHARON would be beneficial in either eye or if the patient should be observed off of injections given poor improvement of VA in the past.  - 2/17/23 - seen with Dr. Hernández. VA 20/200 OU despite worsening macular edema OS. Patient may have macular ischemia contributing to baseline vision loss, even if foveal edema resolves (as it did 9/2022). Will obtain IVFA, then have patient return to Dr. Hernández clinic 3/10/23 for review and to determine treatment plan. Will hold off on further injections at this time.    2.  Cataracts OU  - Likely not visually significant.    RTC 3/1/23 for IVFA, followed by Dr. Hernández 3/10/23 for review

## 2023-03-01 ENCOUNTER — CLINICAL SUPPORT (OUTPATIENT)
Dept: OPHTHALMOLOGY | Facility: CLINIC | Age: 63
End: 2023-03-01
Payer: MEDICAID

## 2023-03-01 VITALS — HEIGHT: 60 IN | WEIGHT: 140 LBS | BODY MASS INDEX: 27.48 KG/M2

## 2023-03-01 DIAGNOSIS — E11.3513 PROLIFERATIVE DIABETIC RETINOPATHY OF BOTH EYES WITH MACULAR EDEMA ASSOCIATED WITH TYPE 2 DIABETES MELLITUS: Primary | ICD-10-CM

## 2023-03-01 PROCEDURE — 92235 FLUORESCEIN ANGRPH MLTIFRAME: CPT | Mod: PBBFAC,PO,GC | Performed by: STUDENT IN AN ORGANIZED HEALTH CARE EDUCATION/TRAINING PROGRAM

## 2023-03-01 PROCEDURE — 99213 OFFICE O/P EST LOW 20 MIN: CPT | Mod: PBBFAC,PO | Performed by: STUDENT IN AN ORGANIZED HEALTH CARE EDUCATION/TRAINING PROGRAM

## 2023-03-01 RX ORDER — FLUORESCEIN 500 MG/ML
5 INJECTION INTRAVENOUS
Status: COMPLETED | OUTPATIENT
Start: 2023-03-01 | End: 2023-03-01

## 2023-03-01 RX ADMIN — FLUORESCEIN 500 MG: 500 INJECTION INTRAVENOUS at 08:03

## 2023-03-01 NOTE — PROGRESS NOTES
HPI    IVFA   Last edited by Simran Turner MA on 3/1/2023  8:15 AM.            Assessment /Plan     For exam results, see Encounter Report.    Proliferative diabetic retinopathy of both eyes with macular edema associated with type 2 diabetes mellitus  -     fluorescein 500 mg/5 mL (10 %) injection 500 mg                         OCT MAC (2/17/23)  OD: 465; central DME extending IT, similar to prior  OS: 617: central DME with dense exudates, large central vacuoles; worsened from prior    IVFA 3/1/23  OU: large BETZAIDA, severe capillary dropout, scattered NV     1. Stable PDR with mac edema OU  - seen by Dr. Hernández and referred here, recommend future IVFA  - Last A1c in Chart:No results found for: HGBA1C               - per patient a1c ~9, , DM2 for since early 2000s  - patient has poor transportation and is unable to go to  [facility]  - Patient poorly tolerant to discomfort from injections  - YAIR OS: 1st series of 3 completed 11/24/21  - YAIR OD: 1st series of 3 completed 12/15/21  - PRP OS: 12/29/21, 1/4/23   - PRP OD: 1/26/2022, 12/14/22   - Most recent YAIR OD: 3/23/22, 4/27/22, 6/22/22  - Most recent YAIR OS: 3/2/22, 4/6/22, 5/11/22  - OCT mac 7/15/22 with interval worsening on YAIR: Switched from YAIR to SHARON.  - Most recent SHARON OD: 7/27/22, 8/24/22, 9/28/22, 11/30/22   - Most recent SHARON OS: 7/15/22, 8/10/22, 9/7/22, 11/16/22  - 1/4/23 - PRP fill in done OS. Cataracts do not appear visually significant. Looking back at OCTs in 9/2022, VA 20/200 when fovea dry OS which is the same VA as today with edema. Likely visually significant hard exudates.  - 2/17/23 - seen with Dr. Hernández. VA 20/200 OU despite worsening macular edema OS. Patient may have macular ischemia contributing to baseline vision loss, even if foveal edema resolves (as it did 9/2022). Will hold off on further injections at this time.  - 3/1/23 IVFA with severe dropout, enlarged BETZAIDA, NV OU. Return 3/10/23 for review with Dr. Hernández to discuss treatment  options.    2. Cataracts OU  - Likely not visually significant.    RTC Dr. Hernández 3/10/23 for review

## 2023-03-02 ENCOUNTER — TELEPHONE (OUTPATIENT)
Dept: OPHTHALMOLOGY | Facility: CLINIC | Age: 63
End: 2023-03-02
Payer: MEDICAID

## 2023-03-09 NOTE — PROGRESS NOTES
Assessment /Plan     For exam results, see Encounter Report.    There are no diagnoses linked to this encounter.  OCT MAC (3/10/23)  OD: 577; central DME extending IT, loss of ellipsoid zone  OS: 594: central DME with dense exudates, loss of ellipsoid zone    IVFA 3/1/23  OU: large BETZAIDA, severe capillary dropout, scattered NV     1. Stable PDR with mac edema OU  - seen by Dr. Hernández and referred here, recommend future IVFA  - Last A1c in Chart:No results found for: HGBA1C               - per patient a1c ~9, , DM2 for since early 2000s  - patient has poor transportation and is unable to go to  [facility]  - Patient poorly tolerant to discomfort from injections  - YAIR OS: 1st series of 3 completed 11/24/21  - YAIR OD: 1st series of 3 completed 12/15/21  - PRP OS: 12/29/21, 1/4/23   - PRP OD: 1/26/2022, 12/14/22   - Most recent YAIR OD: 3/23/22, 4/27/22, 6/22/22  - Most recent YAIR OS: 3/2/22, 4/6/22, 5/11/22  - OCT mac 7/15/22 with interval worsening on YAIR: Switched from YAIR to SHARON.  - Most recent SHARON OD: 7/27/22, 8/24/22, 9/28/22, 11/30/22   - Most recent SHARON OS: 7/15/22, 8/10/22, 9/7/22, 11/16/22  - 1/4/23 - PRP fill in done OS. Cataracts do not appear visually significant. Looking back at OCTs in 9/2022, VA 20/200 when fovea dry OS which is the same VA as today with edema. Likely visually significant hard exudates.  - 2/17/23 - seen with Dr. Hernández. VA 20/200 OU despite worsening macular edema OS. Patient may have macular ischemia contributing to baseline vision loss, even if foveal edema resolves (as it did 9/2022). Will hold off on further injections at this time.  - 3/1/23 IVFA with severe dropout, enlarged BETZAIDA, NV OU. Return 3/10/23 for review with Dr. Hernández to discuss treatment options.  - 3/10/23: reviewed IVFA with Dr. Hernández. Patient likely has macular ischemia limiting vision in both eyes. Few areas of RANDI and venous beading visible OU. No obvious NVE; however, IVFA does not show areas farther in  periphery. Can try additional injections again to see if vision truly does not improve beyond 20/200, though it is unlikely that macula will become completely dry. Can also consider cataract evaluation in near future; patient would need injection preoperatively, mainly to block any neovascular drive caused by cataract surgery. Patient agreeable to trying SHARON again for now.    2. Cataracts OU  - Removal in near future may offer some improvement in vision, though vision overall likely limited from macular edema    RTC next available SHARON OD

## 2023-03-10 ENCOUNTER — CLINICAL SUPPORT (OUTPATIENT)
Dept: OPHTHALMOLOGY | Facility: CLINIC | Age: 63
End: 2023-03-10
Payer: MEDICAID

## 2023-03-10 VITALS — WEIGHT: 140 LBS | BODY MASS INDEX: 27.48 KG/M2 | HEIGHT: 60 IN

## 2023-03-10 DIAGNOSIS — E11.3513 PROLIFERATIVE DIABETIC RETINOPATHY OF BOTH EYES WITH MACULAR EDEMA ASSOCIATED WITH TYPE 2 DIABETES MELLITUS: Primary | ICD-10-CM

## 2023-03-10 PROCEDURE — 99213 OFFICE O/P EST LOW 20 MIN: CPT | Mod: PBBFAC,PO | Performed by: INTERNAL MEDICINE

## 2023-03-10 PROCEDURE — 92134 CPTRZ OPH DX IMG PST SGM RTA: CPT | Mod: PBBFAC,PO | Performed by: INTERNAL MEDICINE

## 2023-03-13 DIAGNOSIS — E11.3513 PROLIFERATIVE DIABETIC RETINOPATHY OF BOTH EYES WITH MACULAR EDEMA ASSOCIATED WITH TYPE 2 DIABETES MELLITUS: Primary | ICD-10-CM

## 2023-04-05 ENCOUNTER — CLINICAL SUPPORT (OUTPATIENT)
Dept: OPHTHALMOLOGY | Facility: CLINIC | Age: 63
End: 2023-04-05
Payer: MEDICAID

## 2023-04-05 VITALS — HEIGHT: 60 IN | WEIGHT: 140 LBS | BODY MASS INDEX: 27.48 KG/M2

## 2023-04-05 DIAGNOSIS — E11.3513 PROLIFERATIVE DIABETIC RETINOPATHY OF BOTH EYES WITH MACULAR EDEMA ASSOCIATED WITH TYPE 2 DIABETES MELLITUS: Primary | ICD-10-CM

## 2023-04-05 PROCEDURE — 92134 CPTRZ OPH DX IMG PST SGM RTA: CPT | Mod: PBBFAC,PO | Performed by: STUDENT IN AN ORGANIZED HEALTH CARE EDUCATION/TRAINING PROGRAM

## 2023-04-05 PROCEDURE — 99214 OFFICE O/P EST MOD 30 MIN: CPT | Mod: PBBFAC,PO,25 | Performed by: STUDENT IN AN ORGANIZED HEALTH CARE EDUCATION/TRAINING PROGRAM

## 2023-04-05 PROCEDURE — 92134 CPTRZ OPH DX IMG PST SGM RTA: CPT | Mod: PBBFAC,PO | Performed by: OPHTHALMOLOGY

## 2023-04-05 PROCEDURE — 67028 INJECTION EYE DRUG: CPT | Mod: PBBFAC,PO,RT | Performed by: STUDENT IN AN ORGANIZED HEALTH CARE EDUCATION/TRAINING PROGRAM

## 2023-04-05 RX ADMIN — BALANCED SALT SOLUTION 15 ML: 6.4; .75; .48; .3; 3.9; 1.7 SOLUTION OPHTHALMIC at 11:04

## 2023-04-05 RX ADMIN — AFLIBERCEPT 2 MG: 40 INJECTION, SOLUTION INTRAVITREAL at 11:04

## 2023-04-05 NOTE — PROGRESS NOTES
Assessment /Plan     For exam results, see Encounter Report.    Proliferative diabetic retinopathy of both eyes with macular edema associated with type 2 diabetes mellitus  -     aflibercept Soln 2 mg  -     balanced salt irrigation intra-ocular solution      OCT MAC 4/2023  OD: central DME extending IT, loss of ellipsoid zone - slight worsening of edema  OS: central DME with dense exudates, loss of ellipsoid zone - slight worsening of edema    IVFA 3/1/23  OU: large BETZAIDA, severe capillary dropout, scattered NV     1. Stable PDR with mac edema OU  - seen by Dr. Hernández and referred here, recommend future IVFA  - Last A1c in Chart:No results found for: HGBA1C               - per patient a1c ~9, , DM2 for since early 2000s  - patient has poor transportation and is unable to go to  [facility]  - Patient poorly tolerant to discomfort from injections  - PRP OS: 12/29/21, 1/4/23   - PRP OD: 1/26/2022, 12/14/22   - Most recent YAIR OD: 3/23/22, 4/27/22, 6/22/22  - Most recent YAIR OS: 3/2/22, 4/6/22, 5/11/22  - OCT mac 7/15/22 with interval worsening on YAIR: Switched from YAIR to SHARON.  - Most recent SHARON OD: 7/27/22, 8/24/22, 9/28/22, 11/30/22   - Most recent SHARON OS: 7/15/22, 8/10/22, 9/7/22, 11/16/22  - 1/4/23 - PRP fill in done OS. Cataracts do not appear visually significant. Looking back at OCTs in 9/2022, VA 20/200 when fovea dry OS which is the same VA as today with edema. Likely visually significant hard exudates.  - 2/17/23 - seen with Dr. Hernández. VA 20/200 OU despite worsening macular edema OS. Patient may have macular ischemia contributing to baseline vision loss, even if foveal edema resolves (as it did 9/2022). Will hold off on further injections at this time.  - 3/1/23 IVFA with severe dropout, enlarged BETZAIDA, NV OU. Return 3/10/23 for review with Dr. Hernández to discuss treatment options.  - 3/10/23: reviewed IVFA with Dr. Hernández. Patient likely has macular ischemia limiting vision in both eyes. Few areas of RANDI  and venous beading visible OU. No obvious NVE; however, IVFA does not show areas farther in periphery. Can try additional injections again to see if vision truly does not improve beyond 20/200, though it is unlikely that macula will become completely dry. Can also consider cataract evaluation in near future; patient would need injection preoperatively, mainly to block any neovascular drive caused by cataract surgery. Patient agreeable to trying SHARON again for now.  - 4/5/23: Here for SHAORN OD, to attempt to improve vision although potential may be limited by enlarged BETZAIDA. Discussed RBAC, consent signed. Tolerated well.     2. Cataracts OU  - Removal in near future may offer some improvement in vision, though vision overall likely limited from macular edema    RTC 2-3 weeks for SHARON OS        Procedure Note: Eylea Intravitreal injection of the Right eye  Pre-and post-procedure diagnosis:  Macular edema    I have explained the risks, benefits and alternatives, of the procedure in detail. The patient voices understanding and all questions have been answered.  The patient agrees to proceed as planned. Signed consent was obtained, the site was marked, and a timeout was performed. Topical proparacaine was used for anesthesia. Topical Betadine was used for antisepsis. 0.05 cc Eylea injected 4 mm from corneal limbus @ inferotemporal position. The eye was then thoroughly irrigated with saline.  No complications were observed and patient tolerated procedure well and vision was confirmed to be count fingers or better after the injection.  The Patient was educated that mild irritation tonight was normal secondary to topical Betadine use, and was further educated that if significant pain or vision loss in occurred within 2-10 days, they should return immediately to see the ophthalmologist.

## 2023-04-06 ENCOUNTER — TELEPHONE (OUTPATIENT)
Dept: OPHTHALMOLOGY | Facility: CLINIC | Age: 63
End: 2023-04-06
Payer: MEDICAID

## 2023-04-06 DIAGNOSIS — E11.3513 PROLIFERATIVE DIABETIC RETINOPATHY OF BOTH EYES WITH MACULAR EDEMA ASSOCIATED WITH TYPE 2 DIABETES MELLITUS: Primary | ICD-10-CM

## 2023-05-08 DIAGNOSIS — E11.311 DIABETIC MACULAR EDEMA OF BOTH EYES: Primary | ICD-10-CM

## 2023-05-10 ENCOUNTER — CLINICAL SUPPORT (OUTPATIENT)
Dept: OPHTHALMOLOGY | Facility: CLINIC | Age: 63
End: 2023-05-10
Payer: MEDICAID

## 2023-05-10 VITALS — WEIGHT: 140 LBS | HEIGHT: 60 IN | BODY MASS INDEX: 27.48 KG/M2

## 2023-05-10 DIAGNOSIS — E11.3513 PROLIFERATIVE DIABETIC RETINOPATHY OF BOTH EYES WITH MACULAR EDEMA ASSOCIATED WITH TYPE 2 DIABETES MELLITUS: ICD-10-CM

## 2023-05-10 DIAGNOSIS — E11.311 DIABETIC MACULAR EDEMA OF BOTH EYES: Primary | ICD-10-CM

## 2023-05-10 PROCEDURE — 96372 THER/PROPH/DIAG INJ SC/IM: CPT | Mod: PBBFAC,PO

## 2023-05-10 PROCEDURE — 92134 CPTRZ OPH DX IMG PST SGM RTA: CPT | Mod: PBBFAC,PO | Performed by: OPHTHALMOLOGY

## 2023-05-10 PROCEDURE — 67028 INJECTION EYE DRUG: CPT | Mod: PBBFAC,PO | Performed by: STUDENT IN AN ORGANIZED HEALTH CARE EDUCATION/TRAINING PROGRAM

## 2023-05-10 PROCEDURE — 92134 CPTRZ OPH DX IMG PST SGM RTA: CPT | Mod: PBBFAC,PO | Performed by: STUDENT IN AN ORGANIZED HEALTH CARE EDUCATION/TRAINING PROGRAM

## 2023-05-10 PROCEDURE — 99213 OFFICE O/P EST LOW 20 MIN: CPT | Mod: PBBFAC,PO | Performed by: STUDENT IN AN ORGANIZED HEALTH CARE EDUCATION/TRAINING PROGRAM

## 2023-05-10 RX ADMIN — BALANCED SALT SOLUTION 15 ML: 6.4; .75; .48; .3; 3.9; 1.7 SOLUTION OPHTHALMIC at 08:05

## 2023-05-10 RX ADMIN — AFLIBERCEPT 2 MG: 40 INJECTION, SOLUTION INTRAVITREAL at 08:05

## 2023-05-10 NOTE — PROGRESS NOTES
HPI    SHARON OS  Last edited by Simran Turner MA on 5/10/2023  7:48 AM.            Assessment /Plan     For exam results, see Encounter Report.      OCT MAC 5/10/2023  OD: 598 central DME extending IT, loss of ellipsoid zone - slight improvement of edema from last  OS: 703 central DME with dense exudates, loss of ellipsoid zone - worsening of edema     IVFA 3/1/23  OU: large BETZAIDA, severe capillary dropout, scattered NV     1. Stable PDR with mac edema OU  - seen by Dr. Hernández and referred here, recommend future IVFA  - Last A1c in Chart:No results found for: HGBA1C               - per patient a1c ~9, , DM2 for since early 2000s  - patient has poor transportation and is unable to go to  [facility]  - Patient poorly tolerant to discomfort from injections  - PRP OS: 12/29/21, 1/4/23   - PRP OD: 1/26/2022, 12/14/22   - Most recent YAIR OD: 3/23/22, 4/27/22, 6/22/22  - Most recent YAIR OS: 3/2/22, 4/6/22, 5/11/22  - OCT mac 7/15/22 with interval worsening on YAIR: Switched from YAIR to SHARON.  - Most recent SHARON OD: 7/27/22, 8/24/22, 9/28/22, 11/30/22, 4/5/2023  - Most recent SHARON OS: 7/15/22, 8/10/22, 9/7/22, 11/16/22, 5/11/2023  - 1/4/23 - PRP fill in done OS. Cataracts do not appear visually significant. Looking back at OCTs in 9/2022, VA 20/200 when fovea dry OS which is the same VA as today with edema. Likely visually significant hard exudates.  - 2/17/23 - seen with Dr. Hernández. VA 20/200 OU despite worsening macular edema OS. Patient may have macular ischemia contributing to baseline vision loss, even if foveal edema resolves (as it did 9/2022). Will hold off on further injections at this time.  - 3/10/23: reviewed IVFA with Dr. Hernández. Patient likely has macular ischemia limiting vision in both eyes. Few areas of RANDI and venous beading visible OU. No obvious NVE; however, IVFA does not show areas farther in periphery. Can try additional injections again to see if vision truly does not improve beyond 20/200, though it is  unlikely that macula will become completely dry. Can also consider cataract evaluation in near future; patient would need injection preoperatively, mainly to block any neovascular drive caused by cataract surgery. Patient agreeable to trying SHARON again for now.  - 5/10/2023: Here for SHARON OS, much worsened edema and sl worse vision in OS, SHARON to attempt to improve vision although potential may be limited by enlarged BETZAIDA. Discussed RBAC, consent signed. Tolerated well. Edema sl improved from last in OD, vision no better though patient feels has improved(has been a month already). Will cont with SHARON series for now to see if improves    2. Cataracts OU  - Removal in near future may offer some improvement in vision, though vision overall likely limited from macular edema     RTC 2-3 weeks for IV eylea OD, octm ou       Eylea procedure note:  Procedure: Eylea (aflibercept) intravitreal injection, left eye  Indication: diabetic macular edema, left eye  Surgeon: Scooter Ying MD  Attending: Simran mclaughlin MD    Risks, benefits, and alternatives of procedure were discussed.  Patient voiced understanding, all questions were answered, and the patient elected to proceed with procedure.  Informed consent was obtained.     A time-out was performed confirming correct patient, procedure, and site. The eye was anesthetized with topical proparacaine, 0.5%. The lid margins and conjunctiva were sterilized with topical betadine. A sterile lid speculum was placed.  Eylea 2.0mg was injected intravitreally in the inferotemporal quadrant, 3.5-4 mm from the limbus. Vision was confirmed with finger counting at 2'. The eye was irrigated with sterile eye wash.  There were no complications.  The patient tolerated the procedure well.  The patient was educated that mild irritation tonight was normal secondary to topical Betadine use.  Patient was informed to follow up immediately if any floaters, flashes, vision loss, severe pain, or other concerns  arose.

## 2023-05-16 DIAGNOSIS — E11.311 DIABETIC MACULAR EDEMA OF BOTH EYES: Primary | ICD-10-CM

## 2023-05-24 ENCOUNTER — CLINICAL SUPPORT (OUTPATIENT)
Dept: OPHTHALMOLOGY | Facility: CLINIC | Age: 63
End: 2023-05-24
Payer: MEDICAID

## 2023-05-24 VITALS — WEIGHT: 140 LBS | BODY MASS INDEX: 27.48 KG/M2 | HEIGHT: 60 IN

## 2023-05-24 DIAGNOSIS — E11.3513 PROLIFERATIVE DIABETIC RETINOPATHY OF BOTH EYES WITH MACULAR EDEMA ASSOCIATED WITH TYPE 2 DIABETES MELLITUS: Primary | ICD-10-CM

## 2023-05-24 PROCEDURE — 92134 CPTRZ OPH DX IMG PST SGM RTA: CPT | Mod: PBBFAC,PO | Performed by: STUDENT IN AN ORGANIZED HEALTH CARE EDUCATION/TRAINING PROGRAM

## 2023-05-24 PROCEDURE — 99213 OFFICE O/P EST LOW 20 MIN: CPT | Mod: PBBFAC,PO | Performed by: STUDENT IN AN ORGANIZED HEALTH CARE EDUCATION/TRAINING PROGRAM

## 2023-05-24 PROCEDURE — 67028 INJECTION EYE DRUG: CPT | Mod: PBBFAC,PO | Performed by: STUDENT IN AN ORGANIZED HEALTH CARE EDUCATION/TRAINING PROGRAM

## 2023-05-24 PROCEDURE — 96372 THER/PROPH/DIAG INJ SC/IM: CPT

## 2023-05-24 PROCEDURE — 92134 CPTRZ OPH DX IMG PST SGM RTA: CPT | Mod: PBBFAC,PO | Performed by: OPHTHALMOLOGY

## 2023-05-24 RX ADMIN — AFLIBERCEPT 2 MG: 40 INJECTION, SOLUTION INTRAVITREAL at 09:05

## 2023-05-24 RX ADMIN — BALANCED SALT SOLUTION 15 ML: 6.4; .75; .48; .3; 3.9; 1.7 SOLUTION OPHTHALMIC at 09:05

## 2023-05-24 NOTE — PROGRESS NOTES
HPI     Diabetic macular edema     Additional comments: eylea OD, octm ou           Comments    Diabetic macular edema          Last edited by Orlin Ashford MA on 5/24/2023  9:31 AM.            Assessment /Plan     For exam results, see Encounter Report.    Proliferative diabetic retinopathy of both eyes with macular edema associated with type 2 diabetes mellitus    Other orders  -     aflibercept Soln 2 mg  -     balanced salt irrigation intra-ocular solution                   OCT MAC 5/24/23  OD: 641 central DME extending IT, loss of ellipsoid zone stable   OS: 257 improved macular edema outside fovea, foveal contour present with dense exudates, loss of ellipsoid zone - improved since last eylea      IVFA 3/1/23  OU: large BETZAIDA, severe capillary dropout, RANDI, no ela leakage      1. Stable PDR with mac edema OU  - seen by Dr. Hernández and referred here, recommend future IVFA  - Last A1c in Chart:No results found for: HGBA1C               - per patient a1c ~9, , DM2 for since early 2000s  - patient has poor transportation and is unable to go to  [facility]  - Patient poorly tolerant to discomfort from injections  - PRP OS: 12/29/21, 1/4/23   - PRP OD: 1/26/2022, 12/14/22   - Most recent YAIR OD: 3/23/22, 4/27/22, 6/22/22  - Most recent YAIR OS: 3/2/22, 4/6/22, 5/11/22.  - Most recent SHARON OD: 7/27/22, 8/24/22, 9/28/22, 11/30/22, 4/5/2023, 5/24/23  - Most recent SHARON OS: 7/15/22, 8/10/22, 9/7/22, 11/16/22, 5/11/2023  - 3/10/23: reviewed IVFA with Dr. Hernández. Patient likely has macular ischemia limiting vision in both eyes. Few areas of RANDI and venous beading visible OU. No obvious NVE; however, IVFA does not show areas farther in periphery. Can try additional injections again to see if vision truly does not improve beyond 20/200, though it is unlikely that macula will become completely dry. Can also consider cataract evaluation in near future; patient would need injection preoperatively, mainly to block any  neovascular drive caused by cataract surgery. Patient agreeable to trying SHARON again for now.  - OCT mac with improved macular edema and VA OS since most recent SHARON 2 weeks ago. OD with diffuse macular edema, stable VA.   - SHARON OD today 5/24/23. No complications, tolerated well. R/B/A's discussed.   - RTC 2 weeks for OCT mac, DFE, likely SHARON OS     2. Cataracts OU  - Removal in near future may offer some improvement in vision, though vision overall likely limited from macular edema     RTC 2-3 weeks for OCT mac, SHARON OS      Eylea procedure note:  Procedure: Eylea (aflibercept) intravitreal injection, right eye  Indication: diabetic macular edema, right eye  Surgeon: Eddie Mello MD  Attending: Simran mclaughlin MD    Risks, benefits, and alternatives of procedure were discussed.  Patient voiced understanding, all questions were answered, and the patient elected to proceed with procedure.  Informed consent was obtained.     A time-out was performed confirming correct patient, procedure, and site. The eye was anesthetized with topical proparacaine, 0.5%. The lid margins and conjunctiva were sterilized with topical betadine. A sterile lid speculum was placed.  Eylea 2.0mg was injected intravitreally in the inferotemporal quadrant, 3.5-4 mm from the limbus. Vision was confirmed with finger counting at 2'. The eye was irrigated with sterile eye wash.  There were no complications.  The patient tolerated the procedure well.  The patient was educated that mild irritation tonight was normal secondary to topical Betadine use.  Patient was informed to follow up immediately if any floaters, flashes, vision loss, severe pain, or other concerns arose.

## 2023-06-12 DIAGNOSIS — E11.311 DIABETIC MACULAR EDEMA OF BOTH EYES: Primary | ICD-10-CM

## 2023-06-26 DIAGNOSIS — E11.311 DIABETIC MACULAR EDEMA OF BOTH EYES: Primary | ICD-10-CM

## 2023-06-28 ENCOUNTER — CLINICAL SUPPORT (OUTPATIENT)
Dept: OPHTHALMOLOGY | Facility: CLINIC | Age: 63
End: 2023-06-28
Payer: MEDICAID

## 2023-06-28 VITALS — WEIGHT: 140 LBS | BODY MASS INDEX: 27.48 KG/M2 | HEIGHT: 60 IN

## 2023-06-28 DIAGNOSIS — E11.311 DIABETIC MACULAR EDEMA OF BOTH EYES: Primary | ICD-10-CM

## 2023-06-28 PROCEDURE — 92134 CPTRZ OPH DX IMG PST SGM RTA: CPT | Mod: PBBFAC,PO | Performed by: OPHTHALMOLOGY

## 2023-06-28 PROCEDURE — 92134 CPTRZ OPH DX IMG PST SGM RTA: CPT | Mod: PBBFAC,PO | Performed by: STUDENT IN AN ORGANIZED HEALTH CARE EDUCATION/TRAINING PROGRAM

## 2023-06-28 PROCEDURE — 67028 INJECTION EYE DRUG: CPT | Mod: PBBFAC,PO,LT | Performed by: STUDENT IN AN ORGANIZED HEALTH CARE EDUCATION/TRAINING PROGRAM

## 2023-06-28 PROCEDURE — 99213 OFFICE O/P EST LOW 20 MIN: CPT | Mod: PBBFAC,PO | Performed by: STUDENT IN AN ORGANIZED HEALTH CARE EDUCATION/TRAINING PROGRAM

## 2023-06-28 RX ADMIN — AFLIBERCEPT 2 MG: 40 INJECTION, SOLUTION INTRAVITREAL at 09:06

## 2023-06-28 RX ADMIN — BALANCED SALT SOLUTION 15 ML: 6.4; .75; .48; .3; 3.9; 1.7 SOLUTION OPHTHALMIC at 09:06

## 2023-06-28 NOTE — PROGRESS NOTES
[facility]    RTC 2-3 weeks for OCT mac, SHARON OS  Last edited by Simran Turner MA on 6/28/2023  8:21 AM.            Assessment /Plan     For exam results, see Encounter Report.    Diabetic macular edema of both eyes    Other orders  -     aflibercept Soln 2 mg  -     balanced salt irrigation intra-ocular solution            OCT MAC 6/28/23  OD:492 central DME extending IT, loss of ellipsoid zone - improved from last  /sp eylea still with significant irf   OS: 348 worsening foveal involving irf with loss of contour, loss of ellipsoid zone - worsening     IVFA 3/1/23  OU: large BETZAIDA, severe capillary dropout, RANDI, no ela leakage      1. Stable PDR with mac edema OU  - seen by Dr. Hernández and referred here, recommend future IVFA  - Last A1c in Chart:No results found for: HGBA1C               - per patient a1c ~9, , DM2 for since early 2000s  - patient has poor transportation and is unable to go to  [facility]  - Patient poorly tolerant to discomfort from injections  - PRP OS: 12/29/21, 1/4/23   - PRP OD: 1/26/2022, 12/14/22   - Most recent YAIR OD: 3/23/22, 4/27/22, 6/22/22  - Most recent YAIR OS: 3/2/22, 4/6/22, 5/11/22.  - Most recent SHARON OD: 7/27/22, 8/24/22, 9/28/22, 11/30/22,  current series:  4/5/2023, 5/24/23  - Most recent SHARON OS: 7/15/22, 8/10/22, 9/7/22, 11/16/22,     current series:  5/11/2023, 6/28/23  - 3/10/23: reviewed IVFA with Dr. Hernández. Patient likely has macular ischemia limiting vision in both eyes. Few areas of RANDI and venous beading visible OU. No obvious NVE; however, IVFA does not show areas farther in periphery. Can try additional injections again to see if vision truly does not improve beyond 20/200, though it is unlikely that macula will become completely dry. Can also consider cataract evaluation in near future; patient would need injection preoperatively, mainly to block any neovascular drive caused by cataract surgery. Patient agreeable to trying SHARON again for now.  - will plan for series of 3  in ou then cataract surgery (OD seems to have better potential on octm) and injection after.   - Vision remains stable at 20/200 ou. IRF improving but significant in OD s/p last injection, IRF worsening in OS today 6/28/23  - SHARON OS 6/28/23 No complications, tolerated well. R/B/A's discussed.   - RTC 1-2 week eylea OD/ dfe octm ou     2. Cataracts OU  - Removal in near future may offer some improvement in vision, though vision overall likely limited from macular edema     RTC 1-2 weeks for dfe OCT mac, SHARON OD        Eylea procedure note:  Procedure: Eylea (aflibercept) intravitreal injection, left eye  Indication: dme, left eye  Surgeon: Scooter Ying MD  Attending: lissette    Risks, benefits, and alternatives of procedure were discussed.  Patient voiced understanding, all questions were answered, and the patient elected to proceed with procedure.  Informed consent was obtained.     A time-out was performed confirming correct patient, procedure, and site. The eye was anesthetized with topical proparacaine, 0.5%. The lid margins and conjunctiva were sterilized with topical betadine. A sterile lid speculum was placed.  Eylea 2.0mg was injected intravitreally in the inferotemporal quadrant, 4 mm from the limbus. Vision was confirmed with finger counting at 2'. The eye was irrigated with sterile eye wash.  There were no complications.  The patient tolerated the procedure well.  The patient was educated that mild irritation tonight was normal secondary to topical Betadine use.  Patient was informed to follow up immediately if any floaters, flashes, vision loss, severe pain, or other concerns arose.

## 2023-07-06 DIAGNOSIS — E11.311 DIABETIC MACULAR EDEMA OF BOTH EYES: Primary | ICD-10-CM

## 2023-07-19 ENCOUNTER — CLINICAL SUPPORT (OUTPATIENT)
Dept: OPHTHALMOLOGY | Facility: CLINIC | Age: 63
End: 2023-07-19
Payer: MEDICAID

## 2023-07-19 VITALS — WEIGHT: 140 LBS | BODY MASS INDEX: 27.48 KG/M2 | HEIGHT: 60 IN

## 2023-07-19 DIAGNOSIS — E11.3513 PROLIFERATIVE DIABETIC RETINOPATHY OF BOTH EYES WITH MACULAR EDEMA ASSOCIATED WITH TYPE 2 DIABETES MELLITUS: Primary | ICD-10-CM

## 2023-07-19 PROCEDURE — 92134 CPTRZ OPH DX IMG PST SGM RTA: CPT | Mod: PBBFAC,PO | Performed by: OPHTHALMOLOGY

## 2023-07-19 PROCEDURE — 99213 OFFICE O/P EST LOW 20 MIN: CPT | Mod: PBBFAC,PO | Performed by: STUDENT IN AN ORGANIZED HEALTH CARE EDUCATION/TRAINING PROGRAM

## 2023-07-19 PROCEDURE — 67028 INJECTION EYE DRUG: CPT | Mod: PBBFAC,PO,RT | Performed by: STUDENT IN AN ORGANIZED HEALTH CARE EDUCATION/TRAINING PROGRAM

## 2023-07-19 RX ADMIN — AFLIBERCEPT 2 MG: 40 INJECTION, SOLUTION INTRAVITREAL at 10:07

## 2023-07-19 RX ADMIN — BALANCED SALT SOLUTION 15 ML: 6.4; .75; .48; .3; 3.9; 1.7 SOLUTION OPHTHALMIC at 10:07

## 2023-07-19 NOTE — PROGRESS NOTES
This patient's history, physical and tests  were reviewed with the resident.  I agree with the assessment and plan of care.   complains of pain/discomfort

## 2023-07-19 NOTE — PROGRESS NOTES
HPI     Diabetic macular edema     Additional comments: DFE, OCT mac, SHARON OD           Comments    Diabetic macular edema          Last edited by Orlin Ashford MA on 7/19/2023  9:38 AM.            Assessment /Plan     For exam results, see Encounter Report.    There are no diagnoses linked to this encounter.          OCT MAC 7/19/23  OD: 705 central diffuse DME , loss of ellipsoid zone - worsening  OS: 231 foveal involving irf with loss of contour, loss of ellipsoid zone - improving s/p SHARON     IVFA 3/1/23  OU: large BETZAIDA, severe capillary dropout, RANDI, no ela leakage      1. Stable PDR with mac edema OU  - seen by Dr. Hernández and referred here, recommend future IVFA  - Last A1c in Chart:No results found for: HGBA1C               - per patient a1c ~9, , DM2 for since early 2000s  - patient has poor transportation and is unable to go to  [facility]  - Patient poorly tolerant to discomfort from injections  - PRP OS: 12/29/21, 1/4/23   - PRP OD: 1/26/2022, 12/14/22   - Most recent YAIR OD: 3/23/22, 4/27/22, 6/22/22  - Most recent YAIR OS: 3/2/22, 4/6/22, 5/11/22.  - Most recent SHARON OD: 7/27/22, 8/24/22, 9/28/22, 11/30/22,  current series:  4/5/2023, 5/24/23, 7/19/23  - Most recent SHARON OS: 7/15/22, 8/10/22, 9/7/22, 11/16/22,     current series:  5/11/2023, 6/28/23  - 3/10/23: reviewed IVFA with Dr. Hernández. Patient likely has macular ischemia limiting vision in both eyes. Few areas of RANDI and venous beading visible OU. No obvious NVE; however, IVFA does not show areas farther in periphery. Can try additional injections again to see if vision truly does not improve beyond 20/200, though it is unlikely that macula will become completely dry. Can also consider cataract evaluation in near future; patient would need injection preoperatively, mainly to block any neovascular drive caused by cataract surgery. Patient agreeable to trying SHARON again for now.  - will plan for series of 3 in ou then cataract surgery (OD seems to  have better potential on octm) and injection after.   - Vision remains poor at 20/400 ou. IRF fluctuating but significant in OD. Better s/p injection OS  - SHARON OD 7/19/23 No complications, tolerated well. R/B/A's discussed.   - RTC 2 week SHARON OS, possible preop CEIOL OD    2. Cataracts OU  - Removal in near future may offer some improvement in vision, though vision overall likely limited from macular edema     RTC weeks for dfe OCT mac, SHARON OS, possible preop CEIOL OD        Eylea procedure note:  Procedure: Eylea (aflibercept) intravitreal injection, right eye  Indication: dme, right eye  Surgeon: Gianni Baldwin MD  Attending: lissette    Risks, benefits, and alternatives of procedure were discussed.  Patient voiced understanding, all questions were answered, and the patient elected to proceed with procedure.  Informed consent was obtained.     A time-out was performed confirming correct patient, procedure, and site. The eye was anesthetized with topical proparacaine, 0.5%. The lid margins and conjunctiva were sterilized with topical betadine. A sterile lid speculum was placed.  Eylea 2.0mg was injected intravitreally in the inferotemporal quadrant, 4 mm from the limbus. Vision was confirmed with finger counting at 2'. The eye was irrigated with sterile eye wash.  There were no complications.  The patient tolerated the procedure well.  The patient was educated that mild irritation tonight was normal secondary to topical Betadine use.  Patient was informed to follow up immediately if any floaters, flashes, vision loss, severe pain, or other concerns arose.

## 2023-07-20 ENCOUNTER — TELEPHONE (OUTPATIENT)
Dept: OPHTHALMOLOGY | Facility: CLINIC | Age: 63
End: 2023-07-20
Payer: MEDICAID

## 2023-07-20 NOTE — TELEPHONE ENCOUNTER
Post procedure follow up call,  No answer, left message for patient to call clinic with any questions or concerns.

## 2023-07-25 DIAGNOSIS — E11.3513 PROLIFERATIVE DIABETIC RETINOPATHY OF BOTH EYES WITH MACULAR EDEMA ASSOCIATED WITH TYPE 2 DIABETES MELLITUS: Primary | ICD-10-CM

## 2023-08-14 DIAGNOSIS — E11.311 DIABETIC MACULAR EDEMA OF BOTH EYES: Primary | ICD-10-CM

## 2023-08-15 ENCOUNTER — TELEPHONE (OUTPATIENT)
Dept: OPHTHALMOLOGY | Facility: CLINIC | Age: 63
End: 2023-08-15
Payer: MEDICAID

## 2023-08-16 ENCOUNTER — CLINICAL SUPPORT (OUTPATIENT)
Dept: OPHTHALMOLOGY | Facility: CLINIC | Age: 63
End: 2023-08-16
Payer: MEDICAID

## 2023-08-16 VITALS — BODY MASS INDEX: 27.29 KG/M2 | HEIGHT: 60 IN | WEIGHT: 139 LBS

## 2023-08-16 DIAGNOSIS — E11.311 DIABETIC MACULAR EDEMA OF BOTH EYES: Primary | ICD-10-CM

## 2023-08-16 PROCEDURE — 67028 INJECTION EYE DRUG: CPT | Mod: PBBFAC,PO | Performed by: STUDENT IN AN ORGANIZED HEALTH CARE EDUCATION/TRAINING PROGRAM

## 2023-08-16 PROCEDURE — 99213 OFFICE O/P EST LOW 20 MIN: CPT | Mod: PBBFAC,PO,25 | Performed by: STUDENT IN AN ORGANIZED HEALTH CARE EDUCATION/TRAINING PROGRAM

## 2023-08-16 PROCEDURE — 92134 CPTRZ OPH DX IMG PST SGM RTA: CPT | Mod: PBBFAC,PO | Performed by: OPHTHALMOLOGY

## 2023-08-16 PROCEDURE — 92134 CPTRZ OPH DX IMG PST SGM RTA: CPT | Mod: PBBFAC,PO | Performed by: STUDENT IN AN ORGANIZED HEALTH CARE EDUCATION/TRAINING PROGRAM

## 2023-08-16 RX ADMIN — BALANCED SALT SOLUTION 15 ML: 6.4; .75; .48; .3; 3.9; 1.7 SOLUTION OPHTHALMIC at 12:08

## 2023-08-16 RX ADMIN — AFLIBERCEPT 2 MG: 40 INJECTION, SOLUTION INTRAVITREAL at 12:08

## 2023-08-16 NOTE — PROGRESS NOTES
HPI    RTC weeks for dfe OCT mac, SHARON OS, possible preop CEIOL OD  Last edited by Gina Monzon MA on 8/16/2023 10:57 AM.          8/16/23  Assessment /Plan     OCT Mac 8/16/23  OD: 563, central diffuse DME, slightly improved from prior   OS: 342, CI-DME with exudates and distortion of FC- worse form prior     For exam results, see Encounter Report.    There are no diagnoses linked to this encounter.    OCT MAC 7/19/23  OD: 705 central diffuse DME , loss of ellipsoid zone - worsening  OS: 231 foveal involving irf with loss of contour, loss of ellipsoid zone - improving s/p SHARON     IVFA 3/1/23  OU: large BETZAIDA, severe capillary dropout, RANDI, no ela leakage      1. Stable PDR with mac edema OU  - seen by Dr. Hernández and referred here, recommend future IVFA  - Last A1c in Chart:No results found for: HGBA1C               - per patient a1c ~9, , DM2 for since early 2000s  - patient has poor transportation and is unable to go to  [facility]  - Patient poorly tolerant to discomfort from injections  - PRP OS: 12/29/21, 1/4/23   - PRP OD: 1/26/2022, 12/14/22   - Most recent YAIR OD: 3/23/22, 4/27/22, 6/22/22  - Most recent YAIR OS: 3/2/22, 4/6/22, 5/11/22.  - Most recent SHARON OD: 7/27/22, 8/24/22, 9/28/22, 11/30/22,  current series:  4/5/2023, 5/24/23, 7/19/23  - Most recent SHARON OS: 7/15/22, 8/10/22, 9/7/22, 11/16/22,     current series:  5/11/2023, 6/28/23, 8/16/23  - 3/10/23: reviewed IVFA with Dr. Hernández. Patient likely has macular ischemia limiting vision in both eyes. Few areas of RANDI and venous beading visible OU. No obvious NVE; however, IVFA does not show areas farther in periphery. Can try additional injections again to see if vision truly does not improve beyond 20/200, though it is unlikely that macula will become completely dry. Can also consider cataract evaluation in near future; patient would need injection preoperatively, mainly to block any neovascular drive caused by cataract surgery. Patient agreeable to  trying SHARON again for now.  - Patient completed series of 3 Eylea OD but still with significant IRF on 8/16/23- will bring back on [place] day to evaluate for CEIOL OD  - SHARON OS #3/3 given 8/16/23    2. Cataracts OU  - Removal in near future may offer some improvement in vision, though vision overall likely limited from macular edema     RTC 8/23 SHARON OD  Then next available [place] day        Eylea procedure note:  Procedure: Eylea (aflibercept) intravitreal injection, left eye  Indication: dme, left eye  Surgeon: Jacy Camara MD  Attending: Alexis     Lot [id number]  Exp 01/2024    Risks, benefits, and alternatives of procedure were discussed.  Patient voiced understanding, all questions were answered, and the patient elected to proceed with procedure.  Informed consent was obtained.      A time-out was performed confirming correct patient, procedure, and site. The eye was anesthetized with topical proparacaine, 0.5%. The lid margins and conjunctiva were sterilized with topical betadine. A sterile lid speculum was placed.  Eylea 2.0mg was injected intravitreally in the inferotemporal quadrant, 4 mm from the limbus. Vision was confirmed with finger counting. The eye was irrigated with sterile eye wash.  There were no complications.  The patient tolerated the procedure well.  The patient was educated that mild irritation tonight was normal secondary to topical Betadine use.  Patient was informed to follow up immediately if any floaters, flashes, vision loss, severe pain, or other concerns arose.

## 2023-08-17 ENCOUNTER — TELEPHONE (OUTPATIENT)
Dept: OPHTHALMOLOGY | Facility: CLINIC | Age: 63
End: 2023-08-17
Payer: MEDICAID

## 2023-08-17 NOTE — TELEPHONE ENCOUNTER
08/17/2023  1:24 PM  Attempted to call the patient regarding her injection that was done yesterday with no answer or option to leave a voicemail

## 2023-08-21 DIAGNOSIS — E11.311 DIABETIC MACULAR EDEMA OF BOTH EYES: Primary | ICD-10-CM

## 2023-08-22 ENCOUNTER — TELEPHONE (OUTPATIENT)
Dept: OPHTHALMOLOGY | Facility: CLINIC | Age: 63
End: 2023-08-22
Payer: MEDICAID

## 2023-08-22 NOTE — TELEPHONE ENCOUNTER
Pre procedure day call. Called the patient and there was no answer. Left message to return the call

## 2023-08-23 ENCOUNTER — CLINICAL SUPPORT (OUTPATIENT)
Dept: OPHTHALMOLOGY | Facility: CLINIC | Age: 63
End: 2023-08-23
Payer: MEDICAID

## 2023-08-23 VITALS — WEIGHT: 138.88 LBS | BODY MASS INDEX: 26.22 KG/M2 | HEIGHT: 61 IN

## 2023-08-23 DIAGNOSIS — E11.3513 PROLIFERATIVE DIABETIC RETINOPATHY OF BOTH EYES WITH MACULAR EDEMA ASSOCIATED WITH TYPE 2 DIABETES MELLITUS: ICD-10-CM

## 2023-08-23 DIAGNOSIS — E11.311 DIABETIC MACULAR EDEMA OF BOTH EYES: Primary | ICD-10-CM

## 2023-08-23 PROCEDURE — 92134 CPTRZ OPH DX IMG PST SGM RTA: CPT | Mod: PBBFAC,PO,RT | Performed by: STUDENT IN AN ORGANIZED HEALTH CARE EDUCATION/TRAINING PROGRAM

## 2023-08-23 PROCEDURE — 67028 INJECTION EYE DRUG: CPT | Mod: PBBFAC,PO | Performed by: STUDENT IN AN ORGANIZED HEALTH CARE EDUCATION/TRAINING PROGRAM

## 2023-08-23 PROCEDURE — 92134 CPTRZ OPH DX IMG PST SGM RTA: CPT | Mod: PBBFAC,PO,LT | Performed by: OPHTHALMOLOGY

## 2023-08-23 PROCEDURE — 99214 OFFICE O/P EST MOD 30 MIN: CPT | Mod: PBBFAC,PO | Performed by: STUDENT IN AN ORGANIZED HEALTH CARE EDUCATION/TRAINING PROGRAM

## 2023-08-23 RX ADMIN — BALANCED SALT SOLUTION 15 ML: 6.4; .75; .48; .3; 3.9; 1.7 SOLUTION OPHTHALMIC at 10:08

## 2023-08-23 RX ADMIN — AFLIBERCEPT 2 MG: 40 INJECTION, SOLUTION INTRAVITREAL at 10:08

## 2023-08-23 NOTE — PROGRESS NOTES
[facility]    RT 8/23 SHARON OD  Last edited by Jolynn Ha, RN on 8/23/2023  9:08 AM.          [facility]    RTC 8/23 SHARON OD  Last edited by Jolynn Ha, RN on 8/23/2023  9:08 AM.          8/16/23  Assessment /Plan     OCT Mac 8/23/23  OD: 677, central diffuse DME, much worse from prior   OS: 218, CI-DME superiorly with exudates and distortion of FC- improved form prior     For exam results, see Encounter Report.    There are no diagnoses linked to this encounter.       IVFA 3/1/23  OU: large BETZAIDA, severe capillary dropout, RANDI, no ela leakage      1. Stable PDR with mac edema OU  - seen by Dr. Hernández and referred here, recommend future IVFA  - Last A1c in Chart:No results found for: HGBA1C               - per patient a1c ~9, , DM2 for since early 2000s  - patient has poor transportation and is unable to go to  Retina  - Patient poorly tolerant to discomfort from injections  - PRP OS: 12/29/21, 1/4/23   - PRP OD: 1/26/2022, 12/14/22   - Most recent YAIR OD: 3/23/22, 4/27/22, 6/22/22  - Most recent YAIR OS: 3/2/22, 4/6/22, 5/11/22.  - Most recent SHARON OD: 7/27/22, 8/24/22, 9/28/22, 11/30/22,  current series:  4/5/2023, 5/24/23, 7/19/23, 8/23/23  - Most recent SHARON OS: 7/15/22, 8/10/22, 9/7/22, 11/16/22,     current series:  5/11/2023, 6/28/23, 8/16/23  - 3/10/23: reviewed IVFA with Dr. Hernández. Patient likely has macular ischemia limiting vision in both eyes. Few areas of RANDI and venous beading visible OU. No obvious NVE; however, IVFA does not show areas farther in periphery. Can try additional injections again to see if vision truly does not improve beyond 20/200, though it is unlikely that macula will become completely dry. Can also consider cataract evaluation in near future; patient would need injection preoperatively, mainly to block any neovascular drive caused by cataract surgery. Patient agreeable to trying SHARON again for now.  - Patient completed series of 3 Eylea OD but still with significant IRF on 8/16/23 which is  even worse on 8/23/23, however much better response OS. S/p SHARON #4 8/23/23. D/w pt who is now willing to go to [facility] for [facility] eval for Ozurdex given resistant DME OD.  Will complete Cataract OS if DME remains stable, post [facility] referral.     2. Cataracts OU  - Removal in near future may offer some improvement in vision, though vision overall likely limited from macular edema  - Consider CE left eye after [facility] ozurdex eval     RTC [facility] Next avail  RTC [place] after [facility] for further care and Cataract eval OS        Eylea procedure note:  Procedure: Eylea (aflibercept) intravitreal injection, right eye  Indication: dme, right eye  Surgeon: Sawyer Westbrook MD  Attending: Alexis    Risks, benefits, and alternatives of procedure were discussed.  Patient voiced understanding, all questions were answered, and the patient elected to proceed with procedure.  Informed consent was obtained.      A time-out was performed confirming correct patient, procedure, and site. The eye was anesthetized with topical proparacaine, 0.5%. The lid margins and conjunctiva were sterilized with topical betadine. A sterile lid speculum was placed.  Eylea 2.0mg was injected intravitreally in the inferotemporal quadrant, 4 mm from the limbus. Vision was confirmed with finger counting. The eye was irrigated with sterile eye wash.  There were no complications.  The patient tolerated the procedure well.  The patient was educated that mild irritation tonight was normal secondary to topical Betadine use.  Patient was informed to follow up immediately if any floaters, flashes, vision loss, severe pain, or other concerns arose.

## 2023-08-24 ENCOUNTER — TELEPHONE (OUTPATIENT)
Dept: OPHTHALMOLOGY | Facility: CLINIC | Age: 63
End: 2023-08-24
Payer: MEDICAID

## 2023-08-24 NOTE — TELEPHONE ENCOUNTER
08/24/2023 3:12 PM   Tried to call patient to check on them after eye procedure she had done yesterday but she did not answer and had no voice mail set up.

## 2023-09-01 ENCOUNTER — TELEPHONE (OUTPATIENT)
Dept: OPHTHALMOLOGY | Facility: CLINIC | Age: 63
End: 2023-09-01
Payer: MEDICAID

## 2023-09-01 NOTE — TELEPHONE ENCOUNTER
Called and spoke with pt via phone . Informed pt that [facility] has attempted to contact her a couple times. Told pt that I would call [facility] and give them the number where I was able to reach her ([phone]) and also advise [facility] that the pt needs an  when contacted. The pt is aware to answer any calls in the next week or two if she is able so that she can schedule her appointment. Pt voiced understanding and gratitude.

## 2023-09-15 ENCOUNTER — OFFICE VISIT (OUTPATIENT)
Dept: OPHTHALMOLOGY | Facility: CLINIC | Age: 63
End: 2023-09-15
Payer: MEDICAID

## 2023-09-15 VITALS — WEIGHT: 139 LBS | HEIGHT: 61 IN | BODY MASS INDEX: 26.24 KG/M2

## 2023-09-15 DIAGNOSIS — E11.311 DIABETIC MACULAR EDEMA OF BOTH EYES: Primary | ICD-10-CM

## 2023-09-15 DIAGNOSIS — E11.3513 PROLIFERATIVE DIABETIC RETINOPATHY OF BOTH EYES WITH MACULAR EDEMA ASSOCIATED WITH TYPE 2 DIABETES MELLITUS: ICD-10-CM

## 2023-09-15 DIAGNOSIS — H26.9 CATARACT, LEFT EYE: ICD-10-CM

## 2023-09-15 DIAGNOSIS — H25.812 COMBINED FORMS OF AGE-RELATED CATARACT OF LEFT EYE: ICD-10-CM

## 2023-09-15 PROCEDURE — 92134 CPTRZ OPH DX IMG PST SGM RTA: CPT | Mod: PBBFAC,PO | Performed by: STUDENT IN AN ORGANIZED HEALTH CARE EDUCATION/TRAINING PROGRAM

## 2023-09-15 PROCEDURE — 99215 OFFICE O/P EST HI 40 MIN: CPT | Mod: PBBFAC,PO | Performed by: STUDENT IN AN ORGANIZED HEALTH CARE EDUCATION/TRAINING PROGRAM

## 2023-09-15 RX ORDER — MOXIFLOXACIN 5 MG/ML
1 SOLUTION/ DROPS OPHTHALMIC ONCE
Status: CANCELLED | OUTPATIENT
Start: 2023-09-15

## 2023-09-15 RX ORDER — PHENYLEPH/TROPICAMIDE IN WATER 2.5 %-1 %
1 DROPS OPHTHALMIC (EYE) ONCE
Status: COMPLETED | OUTPATIENT
Start: 2023-09-15 | End: 2023-09-15

## 2023-09-15 RX ORDER — FLURBIPROFEN SODIUM 0.3 MG/ML
1 SOLUTION/ DROPS OPHTHALMIC ONCE
Status: CANCELLED | OUTPATIENT
Start: 2023-09-15

## 2023-09-15 RX ORDER — TETRACAINE HYDROCHLORIDE 5 MG/ML
1 SOLUTION OPHTHALMIC ONCE
Status: CANCELLED | OUTPATIENT
Start: 2023-09-15

## 2023-09-15 RX ORDER — SODIUM CHLORIDE 0.9 % (FLUSH) 0.9 %
10 SYRINGE (ML) INJECTION
Status: CANCELLED | OUTPATIENT
Start: 2023-09-15

## 2023-09-15 RX ORDER — PREDNISOLONE ACETATE 10 MG/ML
1 SUSPENSION/ DROPS OPHTHALMIC ONCE
Status: CANCELLED | OUTPATIENT
Start: 2023-09-15

## 2023-09-15 RX ORDER — CYCLOPENTOLAT/TROPIC/PHENYLEPH 1%-1%-2.5%
1 DROPS (EA) OPHTHALMIC (EYE)
Status: CANCELLED | OUTPATIENT
Start: 2023-09-15 | End: 2023-09-15

## 2023-09-15 RX ADMIN — Medication 1 DROP: at 12:09

## 2023-09-15 NOTE — PROGRESS NOTES
Assessment /Plan           OCT Mac 8/23/23  OD: 677, central diffuse DME, much worse from prior   OS: 218, CI-DME superiorly with exudates and distortion of FC- improved form prior     OCTmac 9/15/23  OD: 494 off center, central/inf diffuse IRF, stable  OS: 188, ST IRF but none subfoveally, HE, deep FC with loss of foveal IS/OS    For exam results, see Encounter Report.    Diabetic macular edema of both eyes  -     tropicamide /PHENYLephrine opthalmic solution 1 drop           IVFA 3/1/23  OU: large BETZAIDA, severe capillary dropout, RANDI, no ela leakage      1. Stable PDR with mac edema OU  - seen by Dr. Hernández and referred here, recommend future IVFA  - Last A1c in Chart:No results found for: HGBA1C               - per patient a1c ~9, , DM2 for since early 2000s  - patient has poor transportation and is unable to go to  Retina  - Patient poorly tolerant to discomfort from injections  - PRP OS: 12/29/21, 1/4/23   - PRP OD: 1/26/2022, 12/14/22   - Most recent YAIR OD: 3/23/22, 4/27/22, 6/22/22  - Most recent YAIR OS: 3/2/22, 4/6/22, 5/11/22.  - Most recent SHARON OD: 7/27/22, 8/24/22, 9/28/22, 11/30/22,  current series:  4/5/2023, 5/24/23, 7/19/23, 8/23/23  - Most recent SHARON OS: 7/15/22, 8/10/22, 9/7/22, 11/16/22,     current series:  5/11/2023, 6/28/23, 8/16/23  - 3/10/23: reviewed IVFA with Dr. Hernández. Patient likely has macular ischemia limiting vision in both eyes. Few areas of RANDI and venous beading visible OU. No obvious NVE; however, IVFA does not show areas farther in periphery. Can try additional injections again to see if vision truly does not improve beyond 20/200, though it is unlikely that macula will become completely dry. Can also consider cataract evaluation in near future; patient would need injection preoperatively, mainly to block any neovascular drive caused by cataract surgery. Patient agreeable to trying SHARON again for now.  - Patient completed series of 3 Eylea OD but still with  significant IRF on 8/16/23 which is even worse on 8/23/23, however much better response OS. S/p SHARON #4 8/23/23. DR Hernández OK with sending pt to [facility] for Ozurdex. He believes she will need likely years of injections to stabilize DME.    2. Visually significant age-related cataract, OS  - NSC/PSC 2+/1+  - Patient with visually significant cataract and desires surgical removal. Thoroughly discussed cataract surgery today USING , risks/benefits/alternatives including recurrence of diabetic swelling and chance of vision not improving discussed and informed consent obtained, signed, and placed in the chart.  - MRX done today; BCVA: 20/200  - IOP today: 19  - Pt interested in cataract surgery and qualifies for surgery  - Trauma: denies  - Guttae: none  - Phaco/iridodonesis: no  - Trypan blue: no  - Flomax use: no  - Dilation: 6mm  - Anticoagulant/antiplatelet use: none  - Cooperative with exam:  Pt able to lie flat for 30 minutes, will plan to do under local  - Comorbidities: DM2, HTN - seen with Dr Hernández 9/15/23, he believes cataract is VS and is happy with resolution of most IRF and is OK without immediate preop SHARON given last was 8/16/23.  - Medical clearance: not needed  - Lens to be used: per DR Miranda  - Date of surgery: 9/21/23 with Dr. Miranda  - RTC: POD1    3. Combined cataract OD  - likely VS however given severe DME will wait until better control prior to attempting surgery       RTC [facility] Next avail Ozurdex  RTC POD1 OS

## 2023-09-18 ENCOUNTER — ANESTHESIA EVENT (OUTPATIENT)
Dept: SURGERY | Facility: HOSPITAL | Age: 63
End: 2023-09-18
Payer: MEDICAID

## 2023-09-18 NOTE — ANESTHESIA PREPROCEDURE EVALUATION
"                                                                                                             09/18/2023  Jackie Posey is a 63 y.o., female.    COVID STATUS: MODERNA X 2 (LASTLY 4/13/21)  BETA-BLOCKER: NONE    PAT NURSE PHONE INTERVIEW 9/19/23 w/USE of     PROBLEM LIST:  -  LEFT EYE CATARACT  -  HTN  -  HLD  -  ANXIETY/DEPRESSION   -  GERD (PREV. On OMEPRAZOLE) - ASYMPTOMATIC  -  CKD - 5/5/23 GFR=47, CREAT=1.17  -  T2DM (BASAL INSULIN, ORAL X 2) - 12/13/22 A1c 9.6%; AVG. FASTING GLUCOSE "130's"; DENIES ANY HYPOGLYCEMIA      - OU PDR w/MACULAR EDEMA      - DM PERIPHERAL NEUROPATHY   -  VERTIGO (PREV. on MECLIZINE PRN)  -  OA  -  [place] SPEAKING - KEYA TANG LPN UTILIZED     AM Rx DOS: AMLODIPINE, BENTYL PRN, LEXAPRO, GABAPENTIN    ORDERS -   SURGEON:  OUTSIDE LAB: 12/13/22 A1c; 5/5/23 CBC, BMP;  ANESTHESIA:  DM PROTOCOL    Pre-op Assessment    I have reviewed the Patient Summary Reports.     I have reviewed the Nursing Notes. I have reviewed the NPO Status.   I have reviewed the Medications.     Review of Systems  Anesthesia Hx:  No problems with previous Anesthesia  History of prior surgery of interest to airway management or planning: Denies Family Hx of Anesthesia complications.   Denies Personal Hx of Anesthesia complications.   Hematology/Oncology:  Hematology Normal   Oncology Normal     EENT/Dental:EENT/Dental Normal   Cardiovascular:  Cardiovascular Normal     Pulmonary:  Pulmonary Normal    Renal/:  Renal/ Normal     Hepatic/GI:  Hepatic/GI Normal    Musculoskeletal:  Musculoskeletal Normal    Neurological:  Neurology Normal    Endocrine:  Endocrine Normal    Dermatological:  Skin Normal    Psych:  Psychiatric Normal           Physical Exam  General: Well nourished, Cooperative, Alert and Oriented    Airway:  Mallampati: I / I  Mouth Opening: Normal  TM Distance: Normal  Tongue: Normal  Neck ROM: Normal ROM    Dental:  Intact        Anesthesia " Plan  Type of Anesthesia, risks & benefits discussed:    Anesthesia Type: MAC  Intra-op Monitoring Plan: Standard ASA Monitors  Induction:  IV  Informed Consent: Informed consent signed with the Patient and all parties understand the risks and agree with anesthesia plan.  All questions answered. Patient consented to blood products? No  ASA Score: 3  Day of Surgery Review of History & Physical: H&P Update referred to the surgeon/provider.    Ready For Surgery From Anesthesia Perspective.     .    5/5/23 CBC, BMP

## 2023-09-18 NOTE — DISCHARGE INSTRUCTIONS
· Keep follow up appointment tomorrow at at the [facility]. You will begin drops tonight at bedtime, 1 drop from each bottle 3-5 min apart.     · No bending, lifting, stooping or straining until cleared by MD.     · Keep patch on until follow up appointment and while asleep at home to protect your eye. May remove patch to do drops then replace patch.     · May take Tylenol or Ibuprofen for pain or discomfort if no allergies or contraindications.     · Notify MD if you experience:     · Pain that is unrelieved by over the counter medicines     · if you feel increased pressure in your eye or sharp pain in the eye     · you have excessive, colored, or thick drainage coming from eye     · you see curtain-like darkening in the eye, flashes of light, or other sudden vision changes     · if you have any nausea or vomiting     · fever above 100.4F     · The clinics number is [phone]. If it is after business hours or emergency call [phone].       · Acuda a la inocencia de seguimiento mañana en la [facility]. Comenzará a izabella las gotas esta noche antes de acostarse, 1 gota de cada frasco con un intervalo de 3 a 5 minutos.    · No agacharse, levantar objetos, agacharse ni esforzarse hasta que el médico lo autorice.    · Mantenga el parche puesto hasta la inocencia de seguimiento y mientras duerme en casa para proteger quiñonez lisa. Puede quitar el parche para hacer gotas y luego reemplazar el parche.    · Puede izabella Tylenol o Ibuprofeno para el dolor o malestar si no hay alergias ni contraindicaciones.    · Notifique a MD si experimenta:    · Dolor que no se harshad con medicamentos de venta nandini    · si siente un aumento de presión en el lisa o un dolor charo en el lisa    · tiene secreción excesiva, coloreada o espesa proveniente del lisa    · ve un oscurecimiento en el lisa angella olegario reed, destellos de mahin u otros cambios repentinos en la visión    · si tiene náuseas o vómitos    · fiebre superior a  100.4F    · El número de la clínica es [phone]. Si es después del horario comercial o de emergencia, llame al [phone].

## 2023-09-19 NOTE — TELEPHONE ENCOUNTER
Mary from [facility] called and wanted to report the low BP that the patient had this morning. I informed him that Ana Maria, the patient's caregiver, called around 9am today and reported the BP and spoke to the triage nurse regarding this and a message was sent to Dr. Salazar. Mary said that she wasn't aware that Ana Maria already spoke to someone this morning about this situation and wasn't aware that the patient had a repeat reading of the BP when she spoke to Ana Maria after Ana Maria spoke to the triage nurse (from 84/49 to 109/54). Mary said that they are monitoring his vital sign trends and will be faxing over the results to the office and asks if Dr. Salazar wanted \"parameters\" for the metoprolol medication. Please contact Mary at [phone] and advise. Thank you.   Post procedure follow up call,  No answer, lno VM available to leave message.

## 2023-09-20 ENCOUNTER — TELEPHONE (OUTPATIENT)
Dept: OPHTHALMOLOGY | Facility: CLINIC | Age: 63
End: 2023-09-20
Payer: MEDICAID

## 2023-09-20 NOTE — TELEPHONE ENCOUNTER
09/20/2023  2:54 PM  An attempt to call patient via an  was made twice with no answer or message capability to discuss the upcoming cataract surgery on 9/21/23

## 2023-09-21 ENCOUNTER — ANESTHESIA (OUTPATIENT)
Dept: SURGERY | Facility: HOSPITAL | Age: 63
End: 2023-09-21
Payer: MEDICAID

## 2023-09-21 ENCOUNTER — HOSPITAL ENCOUNTER (OUTPATIENT)
Facility: HOSPITAL | Age: 63
Discharge: HOME | End: 2023-09-21
Attending: OPHTHALMOLOGY | Admitting: OPHTHALMOLOGY
Payer: MEDICAID

## 2023-09-21 DIAGNOSIS — H25.812 COMBINED FORMS OF AGE-RELATED CATARACT OF LEFT EYE: ICD-10-CM

## 2023-09-21 DIAGNOSIS — H26.9 CATARACT, LEFT EYE: ICD-10-CM

## 2023-09-21 LAB — POCT GLUCOSE: 185 MG/DL (ref 70–110)

## 2023-09-21 PROCEDURE — 27201423 OPTIME MED/SURG SUP & DEVICES STERILE SUPPLY: Performed by: OPHTHALMOLOGY

## 2023-09-21 PROCEDURE — 63600175 PHARM REV CODE 636 W HCPCS: Performed by: OPHTHALMOLOGY

## 2023-09-21 PROCEDURE — 36000706: Performed by: OPHTHALMOLOGY

## 2023-09-21 PROCEDURE — 71000015 HC POSTOP RECOV 1ST HR: Performed by: OPHTHALMOLOGY

## 2023-09-21 PROCEDURE — 63600175 PHARM REV CODE 636 W HCPCS: Performed by: NURSE ANESTHETIST, CERTIFIED REGISTERED

## 2023-09-21 PROCEDURE — 37000008 HC ANESTHESIA 1ST 15 MINUTES: Performed by: OPHTHALMOLOGY

## 2023-09-21 PROCEDURE — 25000003 PHARM REV CODE 250: Performed by: SPECIALIST

## 2023-09-21 PROCEDURE — 37000009 HC ANESTHESIA EA ADD 15 MINS: Performed by: OPHTHALMOLOGY

## 2023-09-21 PROCEDURE — 25000003 PHARM REV CODE 250

## 2023-09-21 PROCEDURE — 25000003 PHARM REV CODE 250: Performed by: STUDENT IN AN ORGANIZED HEALTH CARE EDUCATION/TRAINING PROGRAM

## 2023-09-21 PROCEDURE — 36000707: Performed by: OPHTHALMOLOGY

## 2023-09-21 PROCEDURE — 25000003 PHARM REV CODE 250: Performed by: OPHTHALMOLOGY

## 2023-09-21 PROCEDURE — V2632 POST CHMBR INTRAOCULAR LENS: HCPCS | Performed by: OPHTHALMOLOGY

## 2023-09-21 PROCEDURE — D9220A PRA ANESTHESIA: ICD-10-PCS | Mod: ,,, | Performed by: NURSE ANESTHETIST, CERTIFIED REGISTERED

## 2023-09-21 PROCEDURE — D9220A PRA ANESTHESIA: Mod: ,,, | Performed by: NURSE ANESTHETIST, CERTIFIED REGISTERED

## 2023-09-21 DEVICE — IMPLANTABLE DEVICE: Type: IMPLANTABLE DEVICE | Site: EYE | Status: FUNCTIONAL

## 2023-09-21 RX ORDER — LIDOCAINE HYDROCHLORIDE 10 MG/ML
1 INJECTION, SOLUTION EPIDURAL; INFILTRATION; INTRACAUDAL; PERINEURAL ONCE
Status: ACTIVE | OUTPATIENT
Start: 2023-09-21

## 2023-09-21 RX ORDER — MIDAZOLAM HYDROCHLORIDE 1 MG/ML
INJECTION INTRAMUSCULAR; INTRAVENOUS
Status: DISCONTINUED | OUTPATIENT
Start: 2023-09-21 | End: 2023-09-21

## 2023-09-21 RX ORDER — CYCLOPENTOLAT/TROPIC/PHENYLEPH 1%-1%-2.5%
1 DROPS (EA) OPHTHALMIC (EYE)
Status: COMPLETED | OUTPATIENT
Start: 2023-09-21 | End: 2023-09-21

## 2023-09-21 RX ORDER — PROCHLORPERAZINE EDISYLATE 5 MG/ML
5 INJECTION INTRAMUSCULAR; INTRAVENOUS ONCE AS NEEDED
Status: ACTIVE | OUTPATIENT
Start: 2023-09-21 | End: 2035-02-17

## 2023-09-21 RX ORDER — FLURBIPROFEN SODIUM 0.3 MG/ML
SOLUTION/ DROPS OPHTHALMIC
Status: DISCONTINUED | OUTPATIENT
Start: 2023-09-21 | End: 2023-09-21 | Stop reason: HOSPADM

## 2023-09-21 RX ORDER — TETRACAINE HYDROCHLORIDE 5 MG/ML
1 SOLUTION OPHTHALMIC ONCE
Status: COMPLETED | OUTPATIENT
Start: 2023-09-21 | End: 2023-09-21

## 2023-09-21 RX ORDER — MOXIFLOXACIN 5 MG/ML
SOLUTION/ DROPS OPHTHALMIC
Status: DISCONTINUED | OUTPATIENT
Start: 2023-09-21 | End: 2023-09-21 | Stop reason: HOSPADM

## 2023-09-21 RX ORDER — EPINEPHRINE CONVENIENCE KIT 1 MG/ML(1)
KIT INTRAMUSCULAR; SUBCUTANEOUS
Status: DISCONTINUED | OUTPATIENT
Start: 2023-09-21 | End: 2023-09-21 | Stop reason: HOSPADM

## 2023-09-21 RX ORDER — OXYCODONE AND ACETAMINOPHEN 5; 325 MG/1; MG/1
2 TABLET ORAL ONCE
Status: ACTIVE | OUTPATIENT
Start: 2023-09-21

## 2023-09-21 RX ORDER — DIPHENHYDRAMINE HYDROCHLORIDE 50 MG/ML
25 INJECTION INTRAMUSCULAR; INTRAVENOUS ONCE AS NEEDED
Status: ACTIVE | OUTPATIENT
Start: 2023-09-21 | End: 2035-02-17

## 2023-09-21 RX ORDER — ONDANSETRON 2 MG/ML
4 INJECTION INTRAMUSCULAR; INTRAVENOUS ONCE
Status: ACTIVE | OUTPATIENT
Start: 2023-09-21

## 2023-09-21 RX ORDER — PREDNISOLONE ACETATE 10 MG/ML
1 SUSPENSION/ DROPS OPHTHALMIC ONCE
Status: DISCONTINUED | OUTPATIENT
Start: 2023-09-21 | End: 2023-09-21 | Stop reason: HOSPADM

## 2023-09-21 RX ORDER — FLURBIPROFEN SODIUM 0.3 MG/ML
1 SOLUTION/ DROPS OPHTHALMIC ONCE
Status: DISCONTINUED | OUTPATIENT
Start: 2023-09-21 | End: 2023-09-21 | Stop reason: HOSPADM

## 2023-09-21 RX ORDER — DIAZEPAM 5 MG/1
10 TABLET ORAL ONCE
Status: COMPLETED | OUTPATIENT
Start: 2023-09-21 | End: 2023-09-21

## 2023-09-21 RX ORDER — SODIUM CHLORIDE 0.9 % (FLUSH) 0.9 %
10 SYRINGE (ML) INJECTION
Status: DISCONTINUED | OUTPATIENT
Start: 2023-09-21 | End: 2023-09-21 | Stop reason: HOSPADM

## 2023-09-21 RX ORDER — HYDROMORPHONE HYDROCHLORIDE 1 MG/ML
0.2 INJECTION, SOLUTION INTRAMUSCULAR; INTRAVENOUS; SUBCUTANEOUS EVERY 5 MIN PRN
Status: ACTIVE | OUTPATIENT
Start: 2023-09-21

## 2023-09-21 RX ORDER — PREDNISOLONE ACETATE 10 MG/ML
SUSPENSION/ DROPS OPHTHALMIC
Status: DISCONTINUED | OUTPATIENT
Start: 2023-09-21 | End: 2023-09-21 | Stop reason: HOSPADM

## 2023-09-21 RX ORDER — LIDOCAINE HYDROCHLORIDE 10 MG/ML
INJECTION, SOLUTION EPIDURAL; INFILTRATION; INTRACAUDAL; PERINEURAL
Status: DISCONTINUED | OUTPATIENT
Start: 2023-09-21 | End: 2023-09-21 | Stop reason: HOSPADM

## 2023-09-21 RX ORDER — INSULIN ASPART 100 [IU]/ML
4-12 INJECTION, SOLUTION INTRAVENOUS; SUBCUTANEOUS
Status: ACTIVE | OUTPATIENT
Start: 2023-09-21

## 2023-09-21 RX ORDER — HYDROMORPHONE HYDROCHLORIDE 1 MG/ML
0.5 INJECTION, SOLUTION INTRAMUSCULAR; INTRAVENOUS; SUBCUTANEOUS EVERY 5 MIN PRN
Status: ACTIVE | OUTPATIENT
Start: 2023-09-21

## 2023-09-21 RX ORDER — MOXIFLOXACIN 5 MG/ML
1 SOLUTION/ DROPS OPHTHALMIC ONCE
Status: DISCONTINUED | OUTPATIENT
Start: 2023-09-21 | End: 2023-09-21 | Stop reason: HOSPADM

## 2023-09-21 RX ORDER — SODIUM CHLORIDE 9 MG/ML
INJECTION, SOLUTION INTRAVENOUS CONTINUOUS
Status: ACTIVE | OUTPATIENT
Start: 2023-09-21

## 2023-09-21 RX ORDER — MEPERIDINE HYDROCHLORIDE 25 MG/ML
12.5 INJECTION INTRAMUSCULAR; INTRAVENOUS; SUBCUTANEOUS ONCE
Status: ACTIVE | OUTPATIENT
Start: 2023-09-21 | End: 2023-09-22

## 2023-09-21 RX ORDER — IPRATROPIUM BROMIDE AND ALBUTEROL SULFATE 2.5; .5 MG/3ML; MG/3ML
3 SOLUTION RESPIRATORY (INHALATION) ONCE AS NEEDED
Status: ACTIVE | OUTPATIENT
Start: 2023-09-21 | End: 2035-02-17

## 2023-09-21 RX ORDER — INSULIN ASPART 100 [IU]/ML
2-9 INJECTION, SOLUTION INTRAVENOUS; SUBCUTANEOUS
Status: ACTIVE | OUTPATIENT
Start: 2023-09-21

## 2023-09-21 RX ADMIN — Medication 1 DROP: at 07:09

## 2023-09-21 RX ADMIN — SODIUM CHLORIDE: 9 INJECTION, SOLUTION INTRAVENOUS at 08:09

## 2023-09-21 RX ADMIN — DIAZEPAM 5 MG: 5 TABLET ORAL at 08:09

## 2023-09-21 RX ADMIN — MIDAZOLAM HYDROCHLORIDE 2 MG: 1 INJECTION, SOLUTION INTRAMUSCULAR; INTRAVENOUS at 08:09

## 2023-09-21 RX ADMIN — TETRACAINE HYDROCHLORIDE 1 DROP: 5 SOLUTION OPHTHALMIC at 07:09

## 2023-09-21 NOTE — OP NOTE
Ophthalmology Operative Report    Operative Date: 09/21/2023    Discharge Date: 09/21/2023    ATTENDING: MD Ry    RESIDENT: Feng Miranda MD    PREOPERATIVE DIAGNOSIS: Visually significant cataract,  Left Eye.    POSTOPERATIVE DIAGNOSIS: Same    PROCEDURE PERFORMED: Phacoemulsification of the cataract with posterior chamber intraocular lens Left Eye.    ANESTHESIA: local/MAC    COMPLICATIONS: None    ESTIMATED BLOOD LOSS: Minimal    IMPLANT: [id number] +23.00    PROCEDURE IN DETAIL: The patient was brought to the operating room and received topical anesthetic to the eye before being prepped and draped in the usual sterile fashion for intraocular surgery. A lid speculum was placed in the operative eye. A paracentesis was created. Preservative-free lidocaine followed by viscoat was injected into the anterior chamber through the paracentesis. A main wound was then made temporally with a 2.4mm keratome in a triplanar fashion. Next a flap was made in the anterior capsule with a cystotome and a continuous capsulotomy was created with forceps. Hydrodissection was performed with a hydrodissection cannula. The nucleus was rotated and phacoemulsification of the cataract was performed. Residual cortical material was removed using automated irrigation-aspiration technique. Amvisc was injected into the posterior chamber and the intraocular lens was placed in the remaining capsular bag. Residual Amvisc was removed using automated irrigation-aspiration technique. The eye was re-pressurized using BSS solution and both the paracentesis site and the primary surgical site were demonstrated to be watertight at the end of the case with weck-you manipulation. Vigamox, Pred Forte were placed on the cornea. A Gray shield was placed. The patient was taken to the recovery room in good and stable condition. The patient tolerated the procedure well. The patient was instructed to refrain from any heavy lifting, bending, stooping or straining  activities. The patient was discharged home and is to follow-up tomorrow for routine post-operative care.

## 2023-09-21 NOTE — ANESTHESIA POSTPROCEDURE EVALUATION
Anesthesia Post Evaluation    Patient: Jackie Posey    Procedure(s) Performed: Procedure(s) (LRB):  EXTRACTION, CATARACT, WITH IOL INSERTION (Left)    Final Anesthesia Type: MAC      Patient location during evaluation: PACU  Patient participation: Yes- Able to Participate  Level of consciousness: awake and alert and oriented  Post-procedure vital signs: reviewed and stable  Pain management: adequate  Airway patency: patent    PONV status at discharge: No PONV  Anesthetic complications: no      Cardiovascular status: blood pressure returned to baseline and stable  Respiratory status: unassisted, spontaneous ventilation and room air  Hydration status: euvolemic  Follow-up not needed.  Comments: Patient to bed per self          Vitals Value Taken Time   /83 09/21/23 0930   Temp 36.3 °C (97.3 °F) 09/21/23 0930   Pulse 67 09/21/23 0935   Resp 18 09/21/23 0935   SpO2 96 % 09/21/23 0935         No case tracking events are documented in the log.      Pain/Heidi Score: Heidi Score: 10 (9/21/2023  9:34 AM)

## 2023-09-21 NOTE — H&P
"History    Chief complaint:  Painless progressive vision loss    Present Ilness/Diagnosis: Nuclear Sclerotic Cataract, Left Eye    REVIEW OF SYSTEMS:   Review of Systems -   General ROS: negative  Psychological ROS: negative  Ophthalmic ROS: positive for - blurry vision  ENT ROS: negative  Allergy and Immunology ROS: negative  Hematological and Lymphatic ROS: negative  Endocrine ROS: negative  Respiratory ROS: no cough, shortness of breath, or wheezing  Cardiovascular ROS: no chest pain or dyspnea on exertion  Gastrointestinal ROS: no abdominal pain, change in bowel habits, or black or bloody stools  Genito-Urinary ROS: no dysuria, trouble voiding, or hematuria  Musculoskeletal ROS: negative  Neurological ROS: no TIA or stroke symptoms  Dermatological ROS: negative    Active Ambulatory Problems     Diagnosis Date Noted    Proliferative diabetic retinopathy of both eyes with macular edema associated with type 2 diabetes mellitus 07/19/2023     Resolved Ambulatory Problems     Diagnosis Date Noted    No Resolved Ambulatory Problems     Past Medical History:   Diagnosis Date    Cataract     Diabetes mellitus     Diabetic retinopathy     Hypertension        History reviewed. No pertinent family history.    Review of patient's allergies indicates:  No Known Allergies    No current facility-administered medications on file prior to encounter.     Current Outpatient Medications on File Prior to Encounter   Medication Sig Dispense Refill    amLODIPine (NORVASC) 5 MG tablet Take 5 mg by mouth once daily.      atorvastatin (LIPITOR) 40 MG tablet Take 40 mg by mouth.      BD INSULIN SYRINGE ULTRA-FINE 0.5 mL 30 gauge x 1/2" Syrg USE AS DIRECTED ONCE DAILY      BD INSULIN SYRINGE ULTRA-FINE 1 mL 31 gauge x 5/16 Syrg Inject into the skin once daily.      glipiZIDE (GLUCOTROL) 10 MG tablet Take 10 mg by mouth 2 (two) times daily with meals.      insulin glargine (LANTUS) 100 unit/mL injection Inject 30 Units into the skin once " daily.      metFORMIN (GLUCOPHAGE) 1000 MG tablet TAKE ONE TABLET BY MOUTH TWICE DAILY with a meal      cyclobenzaprine (FLEXERIL) 10 MG tablet Take 10 mg by mouth.      diclofenac sodium (VOLTAREN) 1 % Gel Apply 2 g topically 4 (four) times daily.      dicyclomine (BENTYL) 20 mg tablet Take 20 mg by mouth.      EScitalopram oxalate (LEXAPRO) 10 MG tablet Take 10 mg by mouth once daily.      gabapentin (NEURONTIN) 800 MG tablet Take 800 mg by mouth 3 (three) times daily.         Physical Exam    BP: Vital signs stable  General: No apparent distress  HEENT: nuclear sclerotic cataract  Lungs: adequate respiratory effort  Heart: + pulses, regular rate  Abdomen: soft  Rectal/pelvic: deferred    Impression: Visually Significant Cataract, Left Eye    Plan: Phacoemulsification with implantation of Intraocular lens  No need to hold blood thinners or ASA.

## 2023-09-21 NOTE — TRANSFER OF CARE
Anesthesia Transfer of Care Note    Patient: Jackie Posey    Procedure(s) Performed: Procedure(s) (LRB):  EXTRACTION, CATARACT, WITH IOL INSERTION (Left)    Patient location: PACU    Anesthesia Type: general    Transport from OR: Transported from OR on room air with adequate spontaneous ventilation    Post pain: adequate analgesia    Post assessment: no apparent anesthetic complications and tolerated procedure well    Post vital signs: stable    Level of consciousness: awake and alert    Nausea/Vomiting: no nausea/vomiting    Complications: none    Transfer of care protocol was followedComments: Report to Cristal CORONA      Last vitals:   Visit Vitals  BP (!) 183/83 (BP Location: Left arm, Patient Position: Lying)   Pulse 67   Temp 36.3 °C (97.3 °F) (Temporal)   Resp 18   Wt 63 kg (139 lb)   SpO2 96%   Breastfeeding No   BMI 26.26 kg/m²

## 2023-09-21 NOTE — DISCHARGE SUMMARY
"Discharge Summary  Ophthalmology Service    Admit Date: 9/21/2023     Discharge Date: 9/21/2023     Attending Physician: MD Ry     Discharge Physician: Feng Miranda MD    Discharged Condition: Good    Reason for Admission: Combined forms of age-related cataract of left eye [H25.812]  Cataract, left eye [H26.9]     Treatments/Procedures: CEIOL OS (see dictated report for details).    Disposition: Home    Patient Instructions:   - Resume same diet as prior to surgery  - No heavy lifting   - flurbiprofen, vigamox, pred forte 4x daily  - return to clinic 9/22/23    Patient Instructions:   Current Discharge Medication List        CONTINUE these medications which have NOT CHANGED    Details   amLODIPine (NORVASC) 5 MG tablet Take 5 mg by mouth once daily.      atorvastatin (LIPITOR) 40 MG tablet Take 40 mg by mouth.      BD INSULIN SYRINGE ULTRA-FINE 0.5 mL 30 gauge x 1/2" Syrg USE AS DIRECTED ONCE DAILY      BD INSULIN SYRINGE ULTRA-FINE 1 mL 31 gauge x 5/16 Syrg Inject into the skin once daily.      glipiZIDE (GLUCOTROL) 10 MG tablet Take 10 mg by mouth 2 (two) times daily with meals.      insulin glargine (LANTUS) 100 unit/mL injection Inject 30 Units into the skin once daily.      metFORMIN (GLUCOPHAGE) 1000 MG tablet TAKE ONE TABLET BY MOUTH TWICE DAILY with a meal      cyclobenzaprine (FLEXERIL) 10 MG tablet Take 10 mg by mouth.      diclofenac sodium (VOLTAREN) 1 % Gel Apply 2 g topically 4 (four) times daily.      dicyclomine (BENTYL) 20 mg tablet Take 20 mg by mouth.      EScitalopram oxalate (LEXAPRO) 10 MG tablet Take 10 mg by mouth once daily.      gabapentin (NEURONTIN) 800 MG tablet Take 800 mg by mouth 3 (three) times daily.             No discharge procedures on file.    "

## 2023-09-22 ENCOUNTER — CLINICAL SUPPORT (OUTPATIENT)
Dept: OPHTHALMOLOGY | Facility: CLINIC | Age: 63
End: 2023-09-22
Payer: MEDICAID

## 2023-09-22 VITALS
WEIGHT: 139 LBS | BODY MASS INDEX: 26.26 KG/M2 | WEIGHT: 138.88 LBS | SYSTOLIC BLOOD PRESSURE: 179 MMHG | HEIGHT: 61 IN | TEMPERATURE: 97 F | BODY MASS INDEX: 26.22 KG/M2 | OXYGEN SATURATION: 96 % | HEART RATE: 67 BPM | RESPIRATION RATE: 18 BRPM | DIASTOLIC BLOOD PRESSURE: 85 MMHG

## 2023-09-22 DIAGNOSIS — Z98.890 POSTOPERATIVE EYE STATE: Primary | ICD-10-CM

## 2023-09-22 PROCEDURE — 99214 OFFICE O/P EST MOD 30 MIN: CPT | Mod: PBBFAC,PO | Performed by: STUDENT IN AN ORGANIZED HEALTH CARE EDUCATION/TRAINING PROGRAM

## 2023-09-22 NOTE — PROGRESS NOTES
HPI     Post-op Evaluation     Additional comments: 1 day post op s/p CE/IOL OS           Comments    1 day post op s/p CE/IOL OS          Last edited by Orlin Ashford MA on 9/22/2023 10:00 AM.            Assessment /Plan     For exam results, see Encounter Report.    Postoperative eye state      1. s/p CEIOL, Left eye  - DOS: 9/21/23  - Lens Used: MX60E +23.00  - Doing well  - Nani negative  - No evidence of infection  Plan  - Start PredForte QID, Ketorolac QID, and Vigamox QID  - Nocturnal shield for 1 week  - No heavy lifting for 1 week  - Okay to shower but avoid water in eye  - Do not swim for 1 month  - Return immediately for pain, redness or loss of vision (informed of our emergency department which is open 24/7).  - RD and endophthalmitis precautions given  - Written instructions provided to patient upon discharge  - RTC general 1 week

## 2023-09-26 NOTE — PROGRESS NOTES
This patient's history, physical and tests  were reviewed with the resident.  I agree with the assessment and plan of care.   Xeljanz Counseling: I discussed with the patient the risks of Xeljanz therapy including increased risk of infection, liver issues, headache, diarrhea, or cold symptoms. Live vaccines should be avoided. They were instructed to call if they have any problems.

## 2023-09-28 ENCOUNTER — CLINICAL SUPPORT (OUTPATIENT)
Dept: OPHTHALMOLOGY | Facility: CLINIC | Age: 63
End: 2023-09-28
Payer: MEDICAID

## 2023-09-28 VITALS — HEIGHT: 61 IN | BODY MASS INDEX: 26.06 KG/M2 | WEIGHT: 138 LBS

## 2023-09-28 DIAGNOSIS — Z98.890 POSTOPERATIVE EYE STATE: Primary | ICD-10-CM

## 2023-09-28 DIAGNOSIS — H25.812 COMBINED FORMS OF AGE-RELATED CATARACT OF LEFT EYE: ICD-10-CM

## 2023-09-28 PROCEDURE — 99212 OFFICE O/P EST SF 10 MIN: CPT | Mod: PBBFAC,PN

## 2023-09-28 NOTE — PROGRESS NOTES
HPI     Post-op Evaluation     Additional comments: EXTRACTION, CATARACT, WITH IOL INSERTION (left)   9-            Comments     POW1 EXTRACTION, CATARACT, WITH IOL INSERTC general 1 week   RTC general 1 week            Last edited by Gina Monzon MA on 9/28/2023  9:25 AM.            HPI     Post-op Evaluation     Additional comments: EXTRACTION, CATARACT, WITH IOL INSERTION (left)   9-            Comments     POW1 EXTRACTION, CATARACT, WITH IOL INSERTC general 1 week   RTC general 1 week            Last edited by Gina Monzon MA on 9/28/2023  9:25 AM.            Assessment /Plan     For exam results, see Encounter Report.    There are no diagnoses linked to this encounter.    1. s/p CEIOL, Left eye POW#1   - DOS: 9/21/23  - Lens Used: MX60E +23.00  - Doing well, wound Nani negative, IOP controlled  - Stop the following:   - Vigamox   - Eye shield   - Activity restrictions  - Given continued 2+ cell will delay taper  - continue Pred Forte weekly as follows: TID for three weeks  - Continue Acular (ketorolac) QID for three weeks  - Endophthalmitis and RD precautions reviewed    RTC 3 wks for POM1 MRx/DFE, Cat eval OD    NOT ADDRESSED:      IVFA 3/1/23  OU: large BETZAIDA, severe capillary dropout, RANDI, no ela leakage      1. Stable PDR with mac edema OU  - seen by Dr. Hernández and referred here, recommend future IVFA  - Last A1c in Chart:No results found for: HGBA1C               - per patient a1c ~9, , DM2 for since early 2000s  - patient has poor transportation and is unable to go to  [facility]  - Patient poorly tolerant to discomfort from injections  - PRP OS: 12/29/21, 1/4/23   - PRP OD: 1/26/2022, 12/14/22   - Most recent YAIR OD: 3/23/22, 4/27/22, 6/22/22  - Most recent YAIR OS: 3/2/22, 4/6/22, 5/11/22.  - Most recent SHARON OD: 7/27/22, 8/24/22, 9/28/22, 11/30/22,  current series:  4/5/2023, 5/24/23, 7/19/23, 8/23/23  - Most recent SHARON OS: 7/15/22, 8/10/22, 9/7/22, 11/16/22,     current series:   5/11/2023, 6/28/23, 8/16/23  - 3/10/23: reviewed IVFA with Dr. Hernández. Patient likely has macular ischemia limiting vision in both eyes. Few areas of RANDI and venous beading visible OU. No obvious NVE; however, IVFA does not show areas farther in periphery. Can try additional injections again to see if vision truly does not improve beyond 20/200, though it is unlikely that macula will become completely dry. Can also consider cataract evaluation in near future; patient would need injection preoperatively, mainly to block any neovascular drive caused by cataract surgery. Patient agreeable to trying SHARON again for now.  - Patient completed series of 3 Eylea OD but still with significant IRF on 8/16/23 which is even worse on 8/23/23, however much better response OS. S/p SHARON #4 8/23/23. DR Hernández OK with sending pt to [facility] for Ozurdex. He believes she will need likely years of injections to stabilize DME.    2. Visually significant age-related cataract, OS  - NSC/PSC 2+/1+  - Patient with visually significant cataract and desires surgical removal. Thoroughly discussed cataract surgery today USING , risks/benefits/alternatives including recurrence of diabetic swelling and chance of vision not improving discussed and informed consent obtained, signed, and placed in the chart.  - MRX done today; BCVA: 20/200  - IOP today: 19  - Pt interested in cataract surgery and qualifies for surgery  - Trauma: denies  - Guttae: none  - Phaco/iridodonesis: no  - Trypan blue: no  - Flomax use: no  - Dilation: 6mm  - Anticoagulant/antiplatelet use: none  - Cooperative with exam:  Pt able to lie flat for 30 minutes, will plan to do under local  - Comorbidities: DM2, HTN - seen with Dr Hernández 9/15/23, he believes cataract is VS and is happy with resolution of most IRF and is OK without immediate preop SHARON given last was 8/16/23.  - Medical clearance: not needed  - Lens to be used: per DR Mirnada  - Date of surgery: 9/21/23 with Dr. Miranda  -  RTC: POD1    3. Combined cataract OD  - likely VS however given severe DME will wait until better control prior to attempting surgery

## 2023-10-03 ENCOUNTER — TELEPHONE (OUTPATIENT)
Dept: OPHTHALMOLOGY | Facility: CLINIC | Age: 63
End: 2023-10-03
Payer: MEDICAID

## 2023-10-03 RX ORDER — PREDNISOLONE ACETATE 10 MG/ML
1 SUSPENSION/ DROPS OPHTHALMIC 3 TIMES DAILY
Qty: 10 ML | Refills: 0 | Status: SHIPPED | OUTPATIENT
Start: 2023-10-03 | End: 2024-10-02

## 2023-10-03 RX ORDER — FLURBIPROFEN SODIUM 0.3 MG/ML
1 SOLUTION/ DROPS OPHTHALMIC 4 TIMES DAILY
Qty: 2.5 ML | Refills: 0 | Status: SHIPPED | OUTPATIENT
Start: 2023-10-03 | End: 2024-10-02

## 2023-10-03 NOTE — TELEPHONE ENCOUNTER
----- Message from India Lincoln sent at 10/3/2023  1:27 PM CDT -----  Regarding: Refill  Contact: Jackie  Patient called requesting a refill on her pink and white top eye drops.     Confirmed the pharmacy on file        Thanks

## 2023-10-19 ENCOUNTER — OFFICE VISIT (OUTPATIENT)
Dept: OPHTHALMOLOGY | Facility: CLINIC | Age: 63
End: 2023-10-19
Payer: MEDICAID

## 2023-10-19 VITALS — BODY MASS INDEX: 26.06 KG/M2 | WEIGHT: 138 LBS | HEIGHT: 61 IN

## 2023-10-19 DIAGNOSIS — Z98.890 POSTOPERATIVE EYE STATE: Primary | ICD-10-CM

## 2023-10-19 DIAGNOSIS — E11.3513 PROLIFERATIVE DIABETIC RETINOPATHY OF BOTH EYES WITH MACULAR EDEMA ASSOCIATED WITH TYPE 2 DIABETES MELLITUS: ICD-10-CM

## 2023-10-19 PROCEDURE — 99214 OFFICE O/P EST MOD 30 MIN: CPT | Mod: PBBFAC,PN

## 2023-10-19 PROCEDURE — 92134 CPTRZ OPH DX IMG PST SGM RTA: CPT | Mod: PBBFAC,PN | Performed by: OPHTHALMOLOGY

## 2023-10-19 RX ORDER — TRAMADOL HYDROCHLORIDE 50 MG/1
50 TABLET ORAL EVERY 6 HOURS PRN
COMMUNITY
Start: 2023-10-02

## 2023-10-19 RX ORDER — SODIUM BICARBONATE 650 MG/1
TABLET ORAL
COMMUNITY
Start: 2023-10-10

## 2023-10-19 RX ORDER — CLONIDINE HYDROCHLORIDE 0.1 MG/1
0.1 TABLET ORAL
COMMUNITY
Start: 2023-09-27

## 2023-10-19 RX ORDER — TRAZODONE HYDROCHLORIDE 50 MG/1
50 TABLET ORAL NIGHTLY PRN
COMMUNITY
Start: 2023-09-27

## 2023-10-19 RX ORDER — ONDANSETRON 4 MG/1
4 TABLET, FILM COATED ORAL
COMMUNITY

## 2023-10-19 RX ORDER — PHENAZOPYRIDINE HYDROCHLORIDE 100 MG/1
100 TABLET, FILM COATED ORAL 3 TIMES DAILY PRN
COMMUNITY

## 2023-10-19 RX ORDER — CEFDINIR 300 MG/1
300 CAPSULE ORAL 2 TIMES DAILY
COMMUNITY
Start: 2023-10-02

## 2023-10-19 RX ORDER — HYDROCHLOROTHIAZIDE 12.5 MG/1
12.5 CAPSULE ORAL EVERY MORNING
COMMUNITY
Start: 2023-09-27

## 2023-10-19 NOTE — PROGRESS NOTES
HPI     Post-op Evaluation     Additional comments: 1 week post op s/p CE/IOL OS. DOS: 09/21/2023.   Patient is still taking PF and every 4 hours. Patient is very discouraged   that vision has not gotten better. She wants to know if she needs more   laser or injections.     Patient has an appointment with [facility] 10/25/2023.           Comments    1 week post op s/p CE/IOL OS. DOS: 09/21/2023          Last edited by Orlin Ashford MA on 10/19/2023  1:44 PM.        OCT Macula 10/19/2023  OD: Signal 6/10, , blunted foveal contour, no large central IRF   OS: Signal 8/10, , somewhat preserved foveal contour, temporal IRF, membranes, atrophy    Assessment /Plan     For exam results, see Encounter Report.    There are no diagnoses linked to this encounter.    1. Stable PDR with mac edema OU  - seen by Dr. Hernández and referred here  - Last A1c in Chart:No results found for: HGBA1C               - per patient a1c ~9, , DM2 for since early 2000s  - PRP OS: 12/29/21, 1/4/23   - PRP OD: 1/26/2022, 12/14/22   - Most recent YAIR OD: 3/23/22, 4/27/22, 6/22/22  - Most recent YAIR OS: 3/2/22, 4/6/22, 5/11/22.  - Most recent SHARON OD: 7/27/22, 8/24/22, 9/28/22, 11/30/22,  current series:  4/5/2023, 5/24/23, 7/19/23, 8/23/23  - Most recent SHARON OS: 7/15/22, 8/10/22, 9/7/22, 11/16/22,     current series:  5/11/2023, 6/28/23, 8/16/23  - 3/10/23: reviewed IVFA with Dr. Hernández. Patient likely has macular ischemia limiting vision in both eyes. Few areas of RANDI and venous beading visible OU. No obvious NVE; however, IVFA does not show areas farther in periphery. Can try additional injections again to see if vision truly does not improve beyond 20/200, though it is unlikely that macula will become completely dry.   - Patient completed series of 3 Eylea OD but still with significant IRF on 8/16/23 which is even worse on 8/23/23, however much better response OS. S/p SHARON #4 8/23/23. Plan for retina eval/ possible  Ozurdex in [facility] 10/25/23.     2. S/p CEIOL OS  - DOS: 9/21/23  - POM1 VA 20/200, no improvement with refraction, same as pre-op VA.   - Doing slow taper of drops - still on ketorolac QID, PF TID; will stop ketorolac, decrease PF to BID for 2 weeks then once daily for 2 weeks then stop    3. Combined cataract OD  - likely VS however given severe DME will wait until better control prior to attempting surgery    RTC 4-5 weeks AC check, [facility] plan review

## 2024-01-31 ENCOUNTER — OFFICE VISIT (OUTPATIENT)
Dept: OPHTHALMOLOGY | Facility: CLINIC | Age: 64
End: 2024-01-31
Payer: MEDICAID

## 2024-01-31 ENCOUNTER — DOCUMENTATION ONLY (OUTPATIENT)
Dept: OPHTHALMOLOGY | Facility: CLINIC | Age: 64
End: 2024-01-31
Payer: MEDICAID

## 2024-01-31 VITALS — BODY MASS INDEX: 26.06 KG/M2 | HEIGHT: 61 IN | WEIGHT: 138 LBS

## 2024-01-31 DIAGNOSIS — H40.52X0 NVG (NEOVASCULAR GLAUCOMA), LEFT, STAGE UNSPECIFIED: Primary | ICD-10-CM

## 2024-01-31 DIAGNOSIS — E11.3513 PROLIFERATIVE DIABETIC RETINOPATHY OF BOTH EYES WITH MACULAR EDEMA ASSOCIATED WITH TYPE 2 DIABETES MELLITUS: ICD-10-CM

## 2024-01-31 PROCEDURE — 92020 GONIOSCOPY: CPT | Mod: PBBFAC,PN

## 2024-01-31 PROCEDURE — 67028 INJECTION EYE DRUG: CPT | Mod: PBBFAC,PN

## 2024-01-31 PROCEDURE — 99214 OFFICE O/P EST MOD 30 MIN: CPT | Mod: PBBFAC,PN,25

## 2024-01-31 PROCEDURE — 67028 INJECTION EYE DRUG: CPT | Mod: PBBFAC,PN,LT | Performed by: STUDENT IN AN ORGANIZED HEALTH CARE EDUCATION/TRAINING PROGRAM

## 2024-01-31 RX ORDER — DORZOLAMIDE HYDROCHLORIDE AND TIMOLOL MALEATE 20; 5 MG/ML; MG/ML
1 SOLUTION/ DROPS OPHTHALMIC 2 TIMES DAILY
Qty: 10 ML | Refills: 1 | Status: SHIPPED | OUTPATIENT
Start: 2024-01-31 | End: 2025-01-30

## 2024-01-31 RX ORDER — ACETAZOLAMIDE 250 MG/1
500 TABLET ORAL
Status: COMPLETED | OUTPATIENT
Start: 2024-01-31 | End: 2024-01-31

## 2024-01-31 RX ORDER — DORZOLAMIDE HYDROCHLORIDE AND TIMOLOL MALEATE 20; 5 MG/ML; MG/ML
1 SOLUTION/ DROPS OPHTHALMIC 2 TIMES DAILY
Qty: 10 ML | Refills: 1 | Status: SHIPPED | OUTPATIENT
Start: 2024-01-31 | End: 2024-01-31

## 2024-01-31 RX ORDER — BRIMONIDINE TARTRATE 2 MG/ML
1 SOLUTION/ DROPS OPHTHALMIC 3 TIMES DAILY
Qty: 5 ML | Refills: 1 | Status: SHIPPED | OUTPATIENT
Start: 2024-01-31 | End: 2024-03-07

## 2024-01-31 RX ORDER — BRIMONIDINE TARTRATE 2 MG/ML
1 SOLUTION/ DROPS OPHTHALMIC 3 TIMES DAILY
Qty: 5 ML | Refills: 1 | Status: SHIPPED | OUTPATIENT
Start: 2024-01-31 | End: 2024-01-31

## 2024-01-31 RX ADMIN — BALANCED SALT SOLUTION 15 ML: 6.4; .75; .48; .3; 3.9; 1.7 SOLUTION OPHTHALMIC at 04:01

## 2024-01-31 RX ADMIN — ACETAZOLAMIDE 500 MG: 250 TABLET ORAL at 04:01

## 2024-01-31 RX ADMIN — Medication 1.25 MG: at 04:01

## 2024-01-31 NOTE — PROGRESS NOTES
HPI    4-5 weeks AC check  Severe pain and headache in OS for the last 4 weeks  Last edited by Simran Turner MA on 1/31/2024  2:06 PM.            Assessment /Plan     For exam results, see Encounter Report.    NVG (neovascular glaucoma), left, stage unspecified  -     bevacizumab (Avastin) 25 mg/mL ophthalmic injection syringe 1.25 mg  -     balanced salt irrigation intra-ocular solution  -     acetaZOLAMIDE tablet 500 mg  -     dorzolamide-timolol 2-0.5% (COSOPT) 22.3-6.8 mg/mL ophthalmic solution; Place 1 drop into the left eye 2 (two) times daily.  Dispense: 10 mL; Refill: 1  -     brimonidine 0.2% (ALPHAGAN) 0.2 % Drop; Place 1 drop into the left eye 3 (three) times daily.  Dispense: 5 mL; Refill: 1    Proliferative diabetic retinopathy of both eyes with macular edema associated with type 2 diabetes mellitus          Neovascular glaucoma OS  - secondary to proliferative diabetic retinopathy  - last A1c 12/13/22 (9.6), no recent A1c on chart review  - florid NVI OS  - patient presents with left sided head and eye pain for the past one month  - IOP at presentation 54, with 3 rounds of drops (dorzolamide, timolol, brimonidine, latanoprost) down to mid 30s. Given 500mg acetazolamide in clinic.   - Given high pressure and neovascularization, patient with poor follow up, patient will need tap and inject. R/B/A discussed, consent obtained for procedure.  - Post tap and injection IOP: 10 OS.   - Patient sent home on cosopt BID and brimonidine TID OS    Procedure Note, Anterior chamber Tap and Intravitreal Avastin injection, left Eye  Pre-procedure diagnosis: neovascular glaucoma OS  Post-procedure diagnosis: Same as pre-procedure diagnosis  Surgeon: Rick Camp MD  Attending: Simran Espinoza MD   Anti-sepsis: Betadine  Anesthesia: Topical tetracaine, topical proparacaine  Blood loss: None  Complications: None    Procedure in detail:    The procedure was explained in detail to the patient including risks, benefits,  alternatives. Informed consent was obtained from the patient and signed, witnessed, and placed in the chart. A time out was performed to identify the correct patient with two identifiers and correct site.     Topical tetracaine was instilled in the eye. Topical betadine was used for antisepsis. A lid speculum was placed, and another round of topical betadine was placed. While under the slit lamp, a cotton swab was used for counter traction on the cornea and a a TB syringe with a 30 gauge needle was inserted temporally. A small amount of aqueous humor was aspirated, and the needle was subsequently removed. Topical tetracaine was instilled, followed by a drop of topical betadine. Then, 0.05 cc Avastin was injected in the inferotemporal position roughly 4mm from the corneal limbus. The vision was confirmed to be CF @ 5 feet following the injection. The eye was then thoroughly irrigated with BSS. The patient tolerated procedure well.  No complications were observed. The patient was educated that mild irritation tonight was normal secondary to topical Betadine use. A drop of moxifloxacin was instilled. Five minutes later a round of dorzolamide, timolol, and brimonidine were instilled. Post procedure IOP taken ten minutes later was 10. The patient was educated on return precautions for endophthalmitis and retinal detachment, and instructed to return to clinic tomorrow morning for repeat pressure check.        1. Stable PDR with mac edema OU  - seen by Dr. Hernández and referred here  - Last A1c in Chart:No results found for: HGBA1C               - per patient a1c ~9, , DM2 for since early 2000s  - PRP OS: 12/29/21, 1/4/23   - PRP OD: 1/26/2022, 12/14/22   - Most recent YAIR OD: 3/23/22, 4/27/22, 6/22/22  - Most recent YAIR OS: 3/2/22, 4/6/22, 5/11/22.  - Most recent SHARON OD: 7/27/22, 8/24/22, 9/28/22, 11/30/22,  current series:  4/5/2023, 5/24/23, 7/19/23, 8/23/23  - Most recent SHARON OS: 7/15/22, 8/10/22, 9/7/22, 11/16/22,    "  current series:  5/11/2023, 6/28/23, 8/16/23  - 3/10/23: reviewed IVFA with Dr. Hernández. Patient likely has macular ischemia limiting vision in both eyes. Few areas of RANDI and venous beading visible OU. No obvious NVE; however, IVFA does not show areas farther in periphery. Can try additional injections again to see if vision truly does not improve beyond 20/200, though it is unlikely that macula will become completely dry.   - Patient completed series of 3 Eylea OD but still with significant IRF on 8/16/23 which is even worse on 8/23/23, however much better response OS. S/p SHARON #4 8/23/23. Plan for retina eval/ possible Ozurdex in  10/25/23.  - 10/25/23: with Dr. Schwartz in  retina clinic: It is our opinion that the vision in either eye is limited by macular ischemia and the presence of chronic edema. The left eye FC is decent and with recent CEIOL this supports ischemic argument. Either eye is a candidate for Ozurdex given limited injection responses although the right eye may be fist target instead of left. Will start PF QID OU and flurbiprofen (has form post-op) QID OU to asses for steroid response and edema response. At that time instructed to RTC 1mo for repeat DFE, OCTm and consideration ozurdex.  - 01/31/2024: patient lost to follow up. Reports that she was only using a pink top four times daily and a green top four times daily for three weeks after 10/24/2023, and then was "told to stop". No documentation of ophthalmology visits after 10/25/23, no showed on 12/01/23. Patient reports forgetting her appointment. Adamant that she has not used drops for at least two months in either eye.        2. S/p CEIOL OS  - DOS: 9/21/23  - POM1 VA 20/200, no improvement with refraction, same as pre-op VA.   - Doing slow taper of drops - still on ketorolac QID, PF TID; will stop ketorolac, decrease PF to BID for 2 weeks then once daily for 2 weeks then stop  - 1/31/24: reports being off steroid drops OU for two " months.     3. Combined cataract OD  - likely VS however given severe DME will wait until better control prior to attempting surgery

## 2024-01-31 NOTE — PROGRESS NOTES
Assessment /Plan     For exam results, see Encounter Report.    There are no diagnoses linked to this encounter.                 Note review from  retina seen 10/25/2023 (does not come up in this chart as names writtent differently, in  system as mason montalvo      - seen with Efren. Either eye is limited by macular ischemia and chronic edema. Left eye FC is decent on octm and with recent ceiol this supports ischemia as limiting factor. Either eye is candidate for ozurdex given limited response to injections. Would target OD first,  - will start pf qid ou and flurbiprofen qid to assess steroid and edema response. RTC 1 month for dfe octm, and consider ozurdex  - would wait prior to ceiol in OD for better edema control   - PT not seen since this October appt in either  or [place]

## 2024-02-01 ENCOUNTER — OFFICE VISIT (OUTPATIENT)
Dept: OPHTHALMOLOGY | Facility: CLINIC | Age: 64
End: 2024-02-01
Payer: MEDICAID

## 2024-02-01 VITALS — BODY MASS INDEX: 26.06 KG/M2 | HEIGHT: 61 IN | WEIGHT: 138 LBS

## 2024-02-01 DIAGNOSIS — H40.52X0 NVG (NEOVASCULAR GLAUCOMA), LEFT, STAGE UNSPECIFIED: Primary | ICD-10-CM

## 2024-02-01 PROCEDURE — 99214 OFFICE O/P EST MOD 30 MIN: CPT | Mod: PBBFAC,PN

## 2024-02-01 RX ORDER — MOXIFLOXACIN 5 MG/ML
1 SOLUTION/ DROPS OPHTHALMIC
Status: COMPLETED | OUTPATIENT
Start: 2024-02-01 | End: 2024-02-01

## 2024-02-01 RX ADMIN — MOXIFLOXACIN 1 DROP: 5 SOLUTION/ DROPS OPHTHALMIC at 11:02

## 2024-02-01 NOTE — PROGRESS NOTES
HPI    IOP check   Last edited by Maryann Renner LPN on 2/1/2024  9:40 AM.            Assessment /Plan     For exam results, see Encounter Report.    There are no diagnoses linked to this encounter.  NVG (neovascular glaucoma), left, stage unspecified  -     bevacizumab (Avastin) 25 mg/mL ophthalmic injection syringe 1.25 mg  -     balanced salt irrigation intra-ocular solution  -     acetaZOLAMIDE tablet 500 mg  -     dorzolamide-timolol 2-0.5% (COSOPT) 22.3-6.8 mg/mL ophthalmic solution; Place 1 drop into the left eye 2 (two) times daily.  Dispense: 10 mL; Refill: 1  -     brimonidine 0.2% (ALPHAGAN) 0.2 % Drop; Place 1 drop into the left eye 3 (three) times daily.  Dispense: 5 mL; Refill: 1     Proliferative diabetic retinopathy of both eyes with macular edema associated with type 2 diabetes mellitus              Neovascular glaucoma OS  - secondary to proliferative diabetic retinopathy  - last A1c 12/13/22 (9.6), no recent A1c on chart review  - florid NVI OS  - patient presents with left sided head and eye pain for the past one month  - IOP at presentation 54, with 3 rounds of drops (dorzolamide, timolol, brimonidine, latanoprost) down to mid 30s. Given 500mg acetazolamide in clinic.   - Given high pressure and neovascularization, patient with poor follow up, patient will need tap and inject. R/B/A discussed, consent obtained for procedure.  - Post tap and injection IOP: 10 OS.   - Patient sent home on cosopt BID and brimonidine TID OS  - 02/01/2024: Doing well today, improved pain. IOP normal. NVI reduced significantly. Small hyphema noted, cell noted. Siedel positive, small leak from AC Tap site.  Bandage contact lens placed in left eye, will avoid patch due to hyphema and need to monitor vision changes. Vigamox administered in clinic, continue one drop four times a day daily. Discussed and seen with Dr. Espinoza. Patient instructed to stop cosopt and brimonidine OS, has not picked up yet.      Not directly  addressed today     1. Stable PDR with mac edema OU  - seen by Dr. Hernández and referred here  - Last A1c in Chart:No results found for: HGBA1C               - per patient a1c ~9, , DM2 for since early 2000s  - PRP OS: 12/29/21, 1/4/23   - PRP OD: 1/26/2022, 12/14/22   - Most recent YAIR OD: 3/23/22, 4/27/22, 6/22/22  - Most recent YAIR OS: 3/2/22, 4/6/22, 5/11/22.  - Most recent SHARON OD: 7/27/22, 8/24/22, 9/28/22, 11/30/22,  current series:  4/5/2023, 5/24/23, 7/19/23, 8/23/23  - Most recent SHARON OS: 7/15/22, 8/10/22, 9/7/22, 11/16/22,     current series:  5/11/2023, 6/28/23, 8/16/23  - 3/10/23: reviewed IVFA with Dr. Hernández. Patient likely has macular ischemia limiting vision in both eyes. Few areas of RANDI and venous beading visible OU. No obvious NVE; however, IVFA does not show areas farther in periphery. Can try additional injections again to see if vision truly does not improve beyond 20/200, though it is unlikely that macula will become completely dry.   - Patient completed series of 3 Eylea OD but still with significant IRF on 8/16/23 which is even worse on 8/23/23, however much better response OS. S/p SHARON #4 8/23/23. Plan for retina eval/ possible Ozurdex in [facility] 10/25/23.  - 10/25/23: with Dr. Schwartz in  retina clinic: It is our opinion that the vision in either eye is limited by macular ischemia and the presence of chronic edema. The left eye FC is decent and with recent CEIOL this supports ischemic argument. Either eye is a candidate for Ozurdex given limited injection responses although the right eye may be fist target instead of left. Will start PF QID OU and flurbiprofen (has form post-op) QID OU to asses for steroid response and edema response. At that time instructed to RTC 1mo for repeat DFE, OCTm and consideration ozurdex.  - 01/31/2024: patient lost to follow up. Reports that she was only using a pink top four times daily and a green top four times daily for three weeks after 10/24/2023, and then  "was "told to stop". No documentation of ophthalmology visits after 10/25/23, no showed on 12/01/23. Patient reports forgetting her appointment. Adamant that she has not used drops for at least two months in either eye.        2. S/p CEIOL OS  - DOS: 9/21/23  - POM1 VA 20/200, no improvement with refraction, same as pre-op VA.   - Doing slow taper of drops - still on ketorolac QID, PF TID; will stop ketorolac, decrease PF to BID for 2 weeks then once daily for 2 weeks then stop  - 1/31/24: reports being off steroid drops OU for two months.     3. Combined cataract OD  - likely VS however given severe DME will wait until better control prior to attempting surgery    RTC tomorrow for AC check               "

## 2024-02-02 ENCOUNTER — OFFICE VISIT (OUTPATIENT)
Dept: OPHTHALMOLOGY | Facility: CLINIC | Age: 64
End: 2024-02-02
Payer: MEDICAID

## 2024-02-02 VITALS — HEIGHT: 61 IN | BODY MASS INDEX: 26.06 KG/M2 | WEIGHT: 138 LBS

## 2024-02-02 DIAGNOSIS — H40.52X0 NVG (NEOVASCULAR GLAUCOMA), LEFT, STAGE UNSPECIFIED: Primary | ICD-10-CM

## 2024-02-02 DIAGNOSIS — E11.3513 PROLIFERATIVE DIABETIC RETINOPATHY OF BOTH EYES WITH MACULAR EDEMA ASSOCIATED WITH TYPE 2 DIABETES MELLITUS: ICD-10-CM

## 2024-02-02 PROCEDURE — 99214 OFFICE O/P EST MOD 30 MIN: CPT | Mod: PBBFAC,PN

## 2024-02-02 NOTE — PROGRESS NOTES
[facility]    RTC tomorrow for AC check  Last edited by Gina Monzon MA on 2/2/2024  9:06 AM.            Assessment /Plan     For exam results, see Encounter Report.    There are no diagnoses linked to this encounter.              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 Given 500mg acetazolamide in clinic.   - Given high pressure and neovascularization, patient with poor follow up, patient will need tap and inject. R/B/A discussed, consent obtained for procedure.  - Post tap and injection IOP: 10 OS.   - Patient sent home on cosopt BID and brimonidine TID OS  - 02/01/2024: Doing well today, improved pain. IOP normal. NVI reduced significantly. Small hyphema noted, cell noted. Siedel positive, small leak from AC Tap site.  Bandage contact lens placed in left eye, will avoid patch due to hyphema and need to monitor vision changes. Vigamox administered in clinic, continue one drop four times a day daily. Discussed and seen with Dr. Espinoza. Patient instructed to stop cosopt and brimonidine OS, has not picked up yet.   -02/02/2024: Minimal pain reported. IOP 14 // 25. Nani negative cornea, with AC tap site at 3:00 noted to have clotted blood. Complete resolution of hyphema. Faint NVI seen 360 OS.  Will stop vigamox, start cosopt BID. Return to clinic Monday for IOP check. Instructions written for patient. Discussed with Dr. Espinoza.     RTC Monday for IOP, DFE if pressure is controlled, OCT Mac (long history of poorly resolving CME).     Not directly addressed today     1. Stable PDR with mac edema OU  - seen by Dr. Hernández and referred here  - Last A1c in Chart:No results found for: HGBA1C               - per patient a1c ~9, ,  "DM2 for since early 2000s  - PRP OS: 12/29/21, 1/4/23   - PRP OD: 1/26/2022, 12/14/22   - Most recent YAIR OD: 3/23/22, 4/27/22, 6/22/22  - Most recent YAIR OS: 3/2/22, 4/6/22, 5/11/22.  - Most recent SHARON OD: 7/27/22, 8/24/22, 9/28/22, 11/30/22,  current series:  4/5/2023, 5/24/23, 7/19/23, 8/23/23  - Most recent SHRAON OS: 7/15/22, 8/10/22, 9/7/22, 11/16/22,     current series:  5/11/2023, 6/28/23, 8/16/23  - 3/10/23: reviewed IVFA with Dr. Hernández. Patient likely has macular ischemia limiting vision in both eyes. Few areas of RANDI and venous beading visible OU. No obvious NVE; however, IVFA does not show areas farther in periphery. Can try additional injections again to see if vision truly does not improve beyond 20/200, though it is unlikely that macula will become completely dry.   - Patient completed series of 3 Eylea OD but still with significant IRF on 8/16/23 which is even worse on 8/23/23, however much better response OS. S/p SHARON #4 8/23/23. Plan for retina eval/ possible Ozurdex in  10/25/23.  - 10/25/23: with Dr. Schwartz in  retina clinic: It is our opinion that the vision in either eye is limited by macular ischemia and the presence of chronic edema. The left eye FC is decent and with recent CEIOL this supports ischemic argument. Either eye is a candidate for Ozurdex given limited injection responses although the right eye may be fist target instead of left. Will start PF QID OU and flurbiprofen (has form post-op) QID OU to asses for steroid response and edema response. At that time instructed to RTC 1mo for repeat DFE, OCTm and consideration ozurdex.  - 01/31/2024: patient lost to follow up. Reports that she was only using a pink top four times daily and a green top four times daily for three weeks after 10/24/2023, and then was "told to stop". No documentation of ophthalmology visits after 10/25/23, no showed on 12/01/23. Patient reports forgetting her appointment. Adamant that she has not used drops for " at least two months in either eye.         2. S/p CEIOL OS  - DOS: 9/21/23  - POM1 VA 20/200, no improvement with refraction, same as pre-op VA.   - Doing slow taper of drops - still on ketorolac QID, PF TID; will stop ketorolac, decrease PF to BID for 2 weeks then once daily for 2 weeks then stop  - 1/31/24: reports being off steroid drops OU for two months.     3. Combined cataract OD  - likely VS however given severe DME will wait until better control prior to attempting surgery     RTC tomorrow for AC check

## 2024-02-05 ENCOUNTER — OFFICE VISIT (OUTPATIENT)
Dept: OPHTHALMOLOGY | Facility: CLINIC | Age: 64
End: 2024-02-05
Payer: MEDICAID

## 2024-02-05 VITALS — WEIGHT: 138 LBS | HEIGHT: 61 IN | BODY MASS INDEX: 26.06 KG/M2

## 2024-02-05 DIAGNOSIS — H40.52X0 NVG (NEOVASCULAR GLAUCOMA), LEFT, STAGE UNSPECIFIED: ICD-10-CM

## 2024-02-05 PROCEDURE — 99214 OFFICE O/P EST MOD 30 MIN: CPT | Mod: PBBFAC,PN | Performed by: STUDENT IN AN ORGANIZED HEALTH CARE EDUCATION/TRAINING PROGRAM

## 2024-02-05 RX ORDER — BRIMONIDINE TARTRATE 2 MG/ML
1 SOLUTION/ DROPS OPHTHALMIC 3 TIMES DAILY
Qty: 15 ML | Refills: 2 | Status: SHIPPED | OUTPATIENT
Start: 2024-02-05 | End: 2025-02-04

## 2024-02-05 NOTE — PROGRESS NOTES
HPI     Neovascular glaucoma     Additional comments: AC check           Comments    Neovascular glaucoma          Last edited by Orlin Ashford MA on 2/5/2024  9:19 AM.            Assessment /Plan     For exam results, see Encounter Report.    NVG (neovascular glaucoma), left, stage unspecified    Other orders  -     brimonidine 0.2% (ALPHAGAN) 0.2 % Drop; Place 1 drop into the left eye 3 (three) times daily.  Dispense: 15 mL; Refill: 2                      Neovascular glaucoma OS  - secondary to proliferative diabetic retinopathy  - last A1c 12/13/22 (9.6), no recent A1c on chart review  - florid NVI OS  - patient presents 1/31/24 with p[ain and IOP 54 after LTFU for 3mo  - Tap/inject 1/31/24 with trace AC leak and hyphema that was followed for 2 days and has resolved after BCL.  - cosopt BID and brimonidine TID OS, brim stopped after ac tap  - 2/4/24: IOP 16//28 on cosopt only, will restart Brimonidine TID; referring to [facility] retina ASAP and bringing back for Vincent day 2/8/24 for eval drainage device OS if IOP remains elevated after injection and drops. AC w/o hyphema and k wounds not leaking.          Not directly addressed today     1. Stable PDR with mac edema OU  - seen by Dr. Hernández and referred here  - Last A1c in Chart:No results found for: HGBA1C               - per patient a1c ~9, , DM2 for since early 2000s  - PRP OS: 12/29/21, 1/4/23   - PRP OD: 1/26/2022, 12/14/22   - Most recent YAIR OD: 3/23/22, 4/27/22, 6/22/22  - Most recent YAIR OS: 3/2/22, 4/6/22, 5/11/22.  - Most recent SHARON OD: 7/27/22, 8/24/22, 9/28/22, 11/30/22,  current series:  4/5/2023, 5/24/23, 7/19/23, 8/23/23  - Most recent SHARON OS: 7/15/22, 8/10/22, 9/7/22, 11/16/22,     current series:  5/11/2023, 6/28/23, 8/16/23  - 3/10/23: reviewed IVFA with Dr. Hernández. Patient likely has macular ischemia limiting vision in both eyes. Few areas of RANDI and venous beading visible OU. No obvious NVE; however, IVFA does not show areas farther in  "periphery. Can try additional injections again to see if vision truly does not improve beyond 20/200, though it is unlikely that macula will become completely dry.   - Patient completed series of 3 Eylea OD but still with significant IRF on 8/16/23 which is even worse on 8/23/23, however much better response OS. S/p SHARON #4 8/23/23. Plan for retina eval/ possible Ozurdex in  10/25/23.  - 10/25/23: with Dr. Schwartz in  retina clinic: It is our opinion that the vision in either eye is limited by macular ischemia and the presence of chronic edema. The left eye FC is decent and with recent CEIOL this supports ischemic argument. Either eye is a candidate for Ozurdex given limited injection responses although the right eye may be fist target instead of left. Will start PF QID OU and flurbiprofen (has form post-op) QID OU to asses for steroid response and edema response. At that time instructed to RTC 1mo for repeat DFE, OCTm and consideration ozurdex.  - 01/31/2024: patient lost to follow up. Reports that she was only using a pink top four times daily and a green top four times daily for three weeks after 10/24/2023, and then was "told to stop". No documentation of ophthalmology visits after 10/25/23, no showed on 12/01/23. Patient reports forgetting her appointment. Adamant that she has not used drops for at least two months in either eye.         2. S/p CEIOL OS  - DOS: 9/21/23  - POM1 VA 20/200, no improvement with refraction, same as pre-op VA.   - off drops     3. Combined cataract OD  - likely VS however given severe DME will wait until better control prior to attempting surgery   ------------------------------------      RT [place]        "

## 2024-02-08 ENCOUNTER — OFFICE VISIT (OUTPATIENT)
Dept: OPHTHALMOLOGY | Facility: CLINIC | Age: 64
End: 2024-02-08
Payer: MEDICAID

## 2024-02-08 VITALS — WEIGHT: 138 LBS | BODY MASS INDEX: 26.06 KG/M2 | HEIGHT: 61 IN

## 2024-02-08 DIAGNOSIS — E11.3513 PROLIFERATIVE DIABETIC RETINOPATHY OF BOTH EYES WITH MACULAR EDEMA ASSOCIATED WITH TYPE 2 DIABETES MELLITUS: ICD-10-CM

## 2024-02-08 DIAGNOSIS — H40.52X0 NVG (NEOVASCULAR GLAUCOMA), LEFT, STAGE UNSPECIFIED: Primary | ICD-10-CM

## 2024-02-08 PROCEDURE — 99214 OFFICE O/P EST MOD 30 MIN: CPT | Mod: PBBFAC,PN | Performed by: STUDENT IN AN ORGANIZED HEALTH CARE EDUCATION/TRAINING PROGRAM

## 2024-02-08 RX ORDER — PROPARACAINE HYDROCHLORIDE 5 MG/ML
1 SOLUTION/ DROPS OPHTHALMIC
COMMUNITY
Start: 2023-11-30

## 2024-02-08 RX ORDER — HYDRALAZINE HYDROCHLORIDE 25 MG/1
25 TABLET, FILM COATED ORAL
COMMUNITY
Start: 2024-02-07

## 2024-02-08 RX ORDER — INSULIN GLARGINE 100 [IU]/ML
4 INJECTION, SOLUTION SUBCUTANEOUS
COMMUNITY

## 2024-02-08 RX ORDER — TROPICAMIDE 10 MG/ML
1 SOLUTION/ DROPS OPHTHALMIC
COMMUNITY
Start: 2023-11-30 | End: 2024-03-07

## 2024-02-08 RX ORDER — AMLODIPINE BESYLATE 10 MG/1
10 TABLET ORAL
COMMUNITY

## 2024-02-08 RX ORDER — PHENYLEPHRINE HYDROCHLORIDE 25 MG/ML
1 SOLUTION/ DROPS OPHTHALMIC
COMMUNITY
Start: 2023-11-30

## 2024-02-08 RX ORDER — MECLIZINE HCL 12.5 MG 12.5 MG/1
TABLET ORAL
COMMUNITY
Start: 2023-08-11

## 2024-02-08 NOTE — PROGRESS NOTES
Assessment /Plan     For exam results, see Encounter Report.    NVG (neovascular glaucoma), left, stage unspecified    Proliferative diabetic retinopathy of both eyes with macular edema associated with type 2 diabetes mellitus                   Neovascular glaucoma OS  - secondary to proliferative diabetic retinopathy  - last A1c 12/13/22 (9.6), no recent A1c on chart review  - florid NVI OS  - patient presents 1/31/24 with p[ain and IOP 54 after LTFU for 3mo  - Tap/inject 1/31/24 with trace AC leak and hyphema that was followed for 2 days and has resolved after BCL.  - 2/8/24: IOP 18//19 on cosopt alone, no NVI or NVA; responded well to drops and avastin, will continue Cosopt BID with strict return precautions.  Will not pursue surgical intervention at this time.  RTC 3-4 weeks DFE OCTm.  Long discussion about visual potential OU today as described below.          Not directly addressed today     1. Stable PDR with mac edema OU  - seen by Dr. Hernández and referred here  - Last A1c in Chart:No results found for: HGBA1C               - per patient a1c ~9, , DM2 for since early 2000s  - PRP OS: 12/29/21, 1/4/23   - PRP OD: 1/26/2022, 12/14/22   - Most recent YAIR OD: 3/23/22, 4/27/22, 6/22/22  - Most recent YAIR OS: 3/2/22, 4/6/22, 5/11/22.  - Most recent SHARON OD: 7/27/22, 8/24/22, 9/28/22, 11/30/22,  current series:  4/5/2023, 5/24/23, 7/19/23, 8/23/23  - Most recent SHARON OS: 7/15/22, 8/10/22, 9/7/22, 11/16/22,     current series:  5/11/2023, 6/28/23, 8/16/23  - 3/10/23: reviewed IVFA with Dr. Hernández. Patient likely has macular ischemia limiting vision in both eyes. Few areas of RANDI and venous beading visible OU. No obvious NVE; however, IVFA does not show areas farther in periphery. Can try additional injections again to see if vision truly does not improve beyond 20/200, though it is unlikely that macula will become completely dry.   - Patient completed series of 3 Eylea OD but still with significant IRF on 8/16/23  "which is even worse on 8/23/23, however much better response OS. S/p SHARON #4 8/23/23. Plan for retina eval/ possible Ozurdex in  10/25/23.  - 10/25/23: with Dr. Schwartz in  retina clinic: It is our opinion that the vision in either eye is limited by macular ischemia and the presence of chronic edema. The left eye FC is decent and with recent CEIOL this supports ischemic argument. Either eye is a candidate for Ozurdex given limited injection responses although the right eye may be fist target instead of left. Will start PF QID OU and flurbiprofen (has form post-op) QID OU to asses for steroid response and edema response. At that time instructed to RTC 1mo for repeat DFE, OCTm and consideration ozurdex.  - 01/31/2024: patient lost to follow up. Reports that she was only using a pink top four times daily and a green top four times daily for three weeks after 10/24/2023, and then was "told to stop". No documentation of ophthalmology visits after 10/25/23, no showed on 12/01/23. Patient reports forgetting her appointment. Adamant that she has not used drops for at least two months in either eye.         2. S/p CEIOL OS  - DOS: 9/21/23  - POM1 VA 20/200, no improvement with refraction, same as pre-op VA.   - off drops     3. Combined cataract OD  - likely VS however given severe DME will wait until better control prior to attempting surgery   ------------------------------------      RTC 3-4 weeks DFE OCTm    "

## 2024-03-07 ENCOUNTER — OFFICE VISIT (OUTPATIENT)
Dept: OPHTHALMOLOGY | Facility: CLINIC | Age: 64
End: 2024-03-07
Payer: MEDICAID

## 2024-03-07 VITALS — WEIGHT: 138 LBS | HEIGHT: 61 IN | BODY MASS INDEX: 26.06 KG/M2

## 2024-03-07 DIAGNOSIS — E11.3513 PROLIFERATIVE DIABETIC RETINOPATHY OF BOTH EYES WITH MACULAR EDEMA ASSOCIATED WITH TYPE 2 DIABETES MELLITUS: Primary | ICD-10-CM

## 2024-03-07 PROCEDURE — 92134 CPTRZ OPH DX IMG PST SGM RTA: CPT | Mod: PBBFAC,PN | Performed by: STUDENT IN AN ORGANIZED HEALTH CARE EDUCATION/TRAINING PROGRAM

## 2024-03-07 PROCEDURE — 99214 OFFICE O/P EST MOD 30 MIN: CPT | Mod: PBBFAC,PN | Performed by: STUDENT IN AN ORGANIZED HEALTH CARE EDUCATION/TRAINING PROGRAM

## 2024-03-07 NOTE — PROGRESS NOTES
HPI     Proliferative diabetic retinopathy     Additional comments: DFE, OCTm. Patient is worried that she is taking   diabetes medications and A1C is not improving.            Comments    Proliferative diabetic retinopathy of both eyes with macular edema   associated with type 2 diabetes mellitus          Last edited by Orlin Ashford MA on 3/7/2024  2:20 PM.      Assessment /Plan     For exam results, see Encounter Report.    Proliferative diabetic retinopathy of both eyes with macular edema associated with type 2 diabetes mellitus               Neovascular glaucoma OS  - secondary to proliferative diabetic retinopathy  - last A1c 12/13/22 (9.6), no recent A1c on chart review  - patient presents 1/31/24 with florid NVI OS and pain and IOP 54 after LTFU for 3mo  - Tap/inject 1/31/24 with Cosopt for weeks  afterwards successfully managed  - 3/7/24: no NVI, vision stable, continue cosopt        2. Stable PDR with mac edema OU  - seen by Dr. Hernández and referred here  - Last A1c in Chart:No results found for: HGBA1C               - per patient a1c ~9, , DM2 for since early 2000s  - PRP OS: 12/29/21, 1/4/23   - PRP OD: 1/26/2022, 12/14/22   - Most recent YAIR OD: 3/23/22, 4/27/22, 6/22/22  - Most recent YAIR OS: 3/2/22, 4/6/22, 5/11/22.  - Most recent SHARON OD: 7/27/22, 8/24/22, 9/28/22, 11/30/22,  current series:  4/5/2023, 5/24/23, 7/19/23, 8/23/23  - Most recent SHARON OS: 7/15/22, 8/10/22, 9/7/22, 11/16/22,     current series:  5/11/2023, 6/28/23, 8/16/23  - 3/10/23: reviewed IVFA with Dr. Hernández. Patient likely has macular ischemia limiting vision in both eyes. Few areas of RANDI and venous beading visible OU. No obvious NVE; however, IVFA does not show areas farther in periphery. Can try additional injections again to see if vision truly does not improve beyond 20/200, though it is unlikely that macula will become completely dry.   - Patient completed series of 3 Eylea OD but still with significant IRF on 8/16/23  "which is even worse on 8/23/23, however much better response OS. S/p SHARON #4 8/23/23. Plan for retina eval/ possible Ozurdex in [place] 10/25/23.  - 10/25/23: with Dr. Schwartz in  retina clinic: It is our opinion that the vision in either eye is limited by macular ischemia and the presence of chronic edema. The left eye FC is decent and with recent CEIOL this supports ischemic argument. Either eye is a candidate for Ozurdex given limited injection responses although the right eye may be fist target instead of left. Will start PF QID OU and flurbiprofen (has form post-op) QID OU to asses for steroid response and edema response. At that time instructed to RTC 1mo for repeat DFE, OCTm and consideration ozurdex.  - 01/31/2024: patient lost to follow up. Reports that she was only using a pink top four times daily and a green top four times daily for three weeks after 10/24/2023, and then was "told to stop". No documentation of ophthalmology visits after 10/25/23, no showed on 12/01/23. Patient reports forgetting her appointment. Adamant that she has not used drops for at least two months in either eye.  - 3/7/24: now after NVG resolved OS; still with stable 20/200 vision and slightly worsened edema with atrophy.  Will send back to  within 1mo for f/u as planned prior to LTFU. RTC here 2 weeks for SHARON OD as nothing has been injected and she has worsening edema and we will prophylax against NVG as has happened in the left. Continue Cosopt BID OU        3. S/p CEIOL OS  - DOS: 9/21/23  - POM1 VA 20/200, no improvement with refraction, same as pre-op VA.   - off drops     4. Combined cataract OD  - likely VS however given severe DME will wait until better control prior to attempting surgery   ------------------------------------      Refer back to  retina 4 weeks  RTC 2 weeks SHARON OD    "

## 2024-03-25 DIAGNOSIS — E11.3513 PROLIFERATIVE DIABETIC RETINOPATHY OF BOTH EYES WITH MACULAR EDEMA ASSOCIATED WITH TYPE 2 DIABETES MELLITUS: Primary | ICD-10-CM

## 2024-03-27 ENCOUNTER — CLINICAL SUPPORT (OUTPATIENT)
Dept: OPHTHALMOLOGY | Facility: CLINIC | Age: 64
End: 2024-03-27
Payer: MEDICAID

## 2024-03-27 VITALS — HEIGHT: 61 IN | WEIGHT: 137.38 LBS | BODY MASS INDEX: 25.94 KG/M2

## 2024-03-27 DIAGNOSIS — E11.3513 PROLIFERATIVE DIABETIC RETINOPATHY OF BOTH EYES WITH MACULAR EDEMA ASSOCIATED WITH TYPE 2 DIABETES MELLITUS: Primary | ICD-10-CM

## 2024-03-27 PROCEDURE — 92134 CPTRZ OPH DX IMG PST SGM RTA: CPT | Mod: PBBFAC,PN | Performed by: OPHTHALMOLOGY

## 2024-03-27 PROCEDURE — 99214 OFFICE O/P EST MOD 30 MIN: CPT | Mod: PBBFAC,PN,25 | Performed by: STUDENT IN AN ORGANIZED HEALTH CARE EDUCATION/TRAINING PROGRAM

## 2024-03-27 NOTE — LETTER
March 27, 2024      [facility]  [address] [place] [address]  Phone: [phone]       Patient: Jackie Posey   YOB: 1960  Date of Visit: 03/27/2024    To Whom It May Concern:    Carolann Posey, accompanied by Mr. Dennis Yoseph, was at [facility] on 03/27/2024. The patient and her escort may return to work on 03/28/2024 with no restrictions. If you have any questions or concerns, or if I can be of further assistance, please do not hesitate to contact me.    Sincerely,    Dr. Bird Alexander

## 2024-05-13 ENCOUNTER — TELEPHONE (OUTPATIENT)
Dept: INTERNAL MEDICINE | Facility: CLINIC | Age: 64
End: 2024-05-13
Payer: MEDICAID

## 2024-05-13 NOTE — TELEPHONE ENCOUNTER
----- Message from Doug Mohan sent at 5/10/2024  1:29 PM CDT -----  .Type:  Patient Returning Call    Who Called:Dennis pt's neighbor   Who Left Message for Patient:  Does the patient know what this is regarding?:pt wants to be a pt of Dr. Collier because he is [place]ian speaking. I only see that the pt has medicaid and wants to know what is the cost out of pocket?   Would the patient rather a call back or a response via [facility]? Call back   Best Call Back Number:[phone]  Additional Information:

## 2024-06-05 DIAGNOSIS — E11.311 DIABETIC MACULAR EDEMA OF BOTH EYES: Primary | ICD-10-CM

## 2025-04-02 ENCOUNTER — OFFICE VISIT (OUTPATIENT)
Dept: OPHTHALMOLOGY | Facility: CLINIC | Age: 65
End: 2025-04-02
Payer: MEDICAID

## 2025-04-02 VITALS — WEIGHT: 137.38 LBS | BODY MASS INDEX: 25.94 KG/M2 | HEIGHT: 61 IN

## 2025-04-02 DIAGNOSIS — H40.52X0 NVG (NEOVASCULAR GLAUCOMA), LEFT, STAGE UNSPECIFIED: Primary | ICD-10-CM

## 2025-04-02 DIAGNOSIS — H25.11 AGE-RELATED NUCLEAR CATARACT OF RIGHT EYE: ICD-10-CM

## 2025-04-02 DIAGNOSIS — E11.3513 PROLIFERATIVE DIABETIC RETINOPATHY OF BOTH EYES WITH MACULAR EDEMA ASSOCIATED WITH TYPE 2 DIABETES MELLITUS: ICD-10-CM

## 2025-04-02 PROCEDURE — 99214 OFFICE O/P EST MOD 30 MIN: CPT | Mod: PBBFAC,PN

## 2025-04-02 RX ORDER — LEVOTHYROXINE SODIUM 25 UG/1
25 TABLET ORAL EVERY MORNING
COMMUNITY
Start: 2024-11-12

## 2025-04-02 RX ORDER — CARVEDILOL 12.5 MG/1
12.5 TABLET ORAL 2 TIMES DAILY
COMMUNITY
Start: 2024-11-25

## 2025-04-02 RX ORDER — BUTALBITAL, ACETAMINOPHEN AND CAFFEINE 50; 325; 40 MG/1; MG/1; MG/1
1 TABLET ORAL 2 TIMES DAILY PRN
COMMUNITY
Start: 2024-12-19

## 2025-04-02 RX ORDER — ONDANSETRON 4 MG/1
4 TABLET, ORALLY DISINTEGRATING ORAL EVERY 8 HOURS PRN
COMMUNITY
Start: 2025-01-09

## 2025-04-02 RX ORDER — FERROUS SULFATE 325(65) MG
325 TABLET, DELAYED RELEASE (ENTERIC COATED) ORAL
COMMUNITY
Start: 2024-12-10

## 2025-04-02 RX ORDER — DOCUSATE SODIUM 100 MG/1
100 CAPSULE, LIQUID FILLED ORAL DAILY PRN
COMMUNITY
Start: 2024-12-19

## 2025-04-02 RX ORDER — LOSARTAN POTASSIUM 25 MG/1
25 TABLET ORAL
COMMUNITY
Start: 2024-10-08

## 2025-04-02 NOTE — PROGRESS NOTES
Assessment /Plan        OCT Mac   4/2/25:   OD: distorted FC, significant CME   OS: distorted foveal contour, atrophy, significant CME   3/27/24:  (artifact), severe CME stable // , atrophy and exudates centrally, temporal CME worse       Neovascular glaucoma OS  - secondary to proliferative diabetic retinopathy  - last A1c 12/13/22 (9.6), no recent A1c on chart review  - patient presents 1/31/24 with florid NVI OS and pain and IOP 54 after LTFU for 3mo  - Tap/inject 1/31/24 with Cosopt for weeks  afterwards successfully managed  - 4/2/25: LTFU in [place], seen in [facility] after referral from [facility]. Active pdr both eyes. VA LP OS, IOP 58 OS. No NVI, but does have NVG inferiorly. Pt not in pain. Was seen 1 month ago at [facility] with stable VA OS, IOP 22 OS. D/w Dr. Espinoza, due to LP vision and not in pain no acute intervention today, will start on PF QID OS, Atropine BID OS, Cosopt BID OS. RTC Dr. Hernández day next available.      2. Stable PDR with mac edema OU  - seen by Dr. Hernández and referred here  - Last A1c in Chart:No results found for: HGBA1C               - per patient a1c ~9, , DM2 for since early 2000s  - PRP OS: 12/29/21, 1/4/23   - PRP OD: 1/26/2022, 12/14/22   - Most recent YAIR OD: 3/23/22, 4/27/22, 6/22/22  - Most recent YAIR OS: 3/2/22, 4/6/22, 5/11/22.  - Most recent SHARON OD: 7/27/22, 8/24/22, 9/28/22, 11/30/22,  current series:  4/5/2023, 5/24/23, 7/19/23, 8/23/23  - Most recent SHARON OS: 7/15/22, 8/10/22, 9/7/22, 11/16/22,     current series:  5/11/2023, 6/28/23, 8/16/23  - 3/10/23: reviewed IVFA with Dr. Hernández. Patient likely has macular ischemia limiting vision in both eyes. Few areas of RANDI and venous beading visible OU. No obvious NVE; however, IVFA does not show areas farther in periphery. Can try additional injections again to see if vision truly does not improve beyond 20/200, though it is unlikely that macula will become completely dry.   - Patient completed  "series of 3 Eylea OD but still with significant IRF on 8/16/23 which is even worse on 8/23/23, however much better response OS. S/p SHARON #4 8/23/23. Plan for retina eval/ possible Ozurdex in [place] 10/25/23.  - 10/25/23: with Dr. Schwartz in  retina clinic: It is our opinion that the vision in either eye is limited by macular ischemia and the presence of chronic edema. The left eye FC is decent and with recent CEIOL this supports ischemic argument. Either eye is a candidate for Ozurdex given limited injection responses although the right eye may be first target instead of left. Will start PF QID OU and flurbiprofen (has from post-op) QID OU to asses for steroid response and edema response. At that time instructed to RTC 1mo for repeat DFE, OCTm and consideration ozurdex.  - 01/31/2024: patient lost to follow up. Reports that she was only using a pink top four times daily and a green top four times daily for three weeks after 10/24/2023, and then was "told to stop". No documentation of ophthalmology visits after 10/25/23, no showed on 12/01/23. Patient reports forgetting her appointment. Adamant that she has not used drops for at least two months in either eye.  - 3/7/24: now after NVG resolved OS; still with stable 20/200 vision and slightly worsened edema with atrophy.  Will send back to  within 1mo for f/u as planned prior to LTFU. RTC here 2 weeks for SHARON OD as nothing has been injected and she has worsening edema and we will prophylax against NVG as has happened in the left. Continue Cosopt BID OU  - 3/27/24: No NV on exam. Will hold off injection at this time OU. Will arrange to have follow up in  for ozurdex and evaluation for possible PRP to prevent NV versus if we need to keep doing serial injections while severe ME.  - 3/2/25: LTFU in [place], seen in [facility] general clinic after referral from [facility]. Active pdr both eyes. VA LP OS, IOP 58 OS. No NVI, but does have NVG inferiorly. Pt not in " pain. Was seen 1 month ago at [facility] with stable VA OS, IOP 22 OS. D/w Dr. Espinoza, will schedule on next available procedure day for YAIR OD as better seeing eye.     3. S/p CEIOL OS  - DOS: 9/21/23  - POM1 VA 20/200, no improvement with refraction, same as pre-op VA.   - off drops     4. Combined cataract OD  - likely VS however given severe DME will wait until better control prior to attempting surgery   ------------------------------------      RTC next available procedure day possible YAIR OD  RTC Dr. Hernández next available

## 2025-05-09 ENCOUNTER — OFFICE VISIT (OUTPATIENT)
Dept: OPHTHALMOLOGY | Facility: CLINIC | Age: 65
End: 2025-05-09

## 2025-05-09 VITALS — WEIGHT: 137.38 LBS | BODY MASS INDEX: 25.94 KG/M2 | HEIGHT: 61 IN

## 2025-05-09 DIAGNOSIS — E11.3513 PROLIFERATIVE DIABETIC RETINOPATHY OF BOTH EYES WITH MACULAR EDEMA ASSOCIATED WITH TYPE 2 DIABETES MELLITUS: Primary | ICD-10-CM

## 2025-05-09 DIAGNOSIS — H40.52X3 NEOVASCULAR GLAUCOMA OF LEFT EYE, SEVERE STAGE: ICD-10-CM

## 2025-05-09 PROCEDURE — 99213 OFFICE O/P EST LOW 20 MIN: CPT | Mod: PBBFAC,PN

## 2025-05-09 RX ORDER — ATROPINE SULFATE 10 MG/ML
1 SOLUTION/ DROPS OPHTHALMIC 2 TIMES DAILY
Qty: 15 ML | Refills: 3 | Status: SHIPPED | OUTPATIENT
Start: 2025-05-09

## 2025-05-09 RX ORDER — PREDNISOLONE ACETATE 10 MG/ML
1 SUSPENSION/ DROPS OPHTHALMIC 4 TIMES DAILY
Qty: 15 ML | Refills: 5 | Status: SHIPPED | OUTPATIENT
Start: 2025-05-09 | End: 2026-05-09

## 2025-05-09 NOTE — PROGRESS NOTES
HPI     Neovascular glaucoma OS     Additional comments: - PF QID OS, Atropine BID OS, Cosopt BID OS           Comments    Here for F/U for NVG OS/PDR with Edema OU.   - No changes in OU, still very bad.  - States pharmacy only gave her Cosopt, she has been using it bid.            Last edited by Smitha Dye LPN on 5/9/2025  1:04 PM.            Assessment /Plan     For exam results, see Encounter Report.    Proliferative diabetic retinopathy of both eyes with macular edema associated with type 2 diabetes mellitus    Neovascular glaucoma of left eye, severe stage    Other orders  -     atropine 1% (ISOPTO ATROPINE) 1 % Drop; Place 1 drop into the left eye 2 (two) times a day.  Dispense: 15 mL; Refill: 3  -     prednisoLONE acetate (PRED FORTE) 1 % DrpS; Place 1 drop into the left eye 4 (four) times daily.  Dispense: 15 mL; Refill: 5        OCT Mac   4/2/25:   OD: distorted FC, significant CME   OS: distorted foveal contour, atrophy, significant CME   3/27/24:  (artifact), severe CME stable // , atrophy and exudates centrally, temporal CME worse     5/9/25:   , significant CME with disrupted FC  OS 16, poor scan       Neovascular glaucoma OS  - secondary to proliferative diabetic retinopathy  - Last a1c 9.6 from 12/13/2022 no recent labs  - patient presents 1/31/24 with florid NVI OS and pain and IOP 54 after LTFU for 3mo  - Tap/inject 1/31/24 with Cosopt for weeks  afterwards successfully managed  - LTFU 3/2024 - 4/2025  - 4/2/25: LTFU in [place], seen in [facility] general clinic after referral from [facility]. Active pdr both eyes. VA LP OS, IOP 58 OS. No NVI, but does have NVA inferior. Pt not in pain. Was seen 1 month ago at [facility] with stable VA OS, IOP 22 OS. D/w Dr. Espinoza, due to LP vision and not in pain no acute intervention today, will start on PF QID OS, Atropine BID OS, Cosopt BID OS.  - 5/9/25: patient only on cosopt, IOP 39, not in pain. Vision NLP checked with Dr. Hernández.  Comfort care only. PF QID and atropine BID resent to pharmacy.      2. Stable PDR with mac edema OU  - seen by Dr. Hernández and referred here  - Last A1c in Chart:No results found for: HGBA1C               - per patient a1c ~9, , DM2 for since early 2000s  - PRP OS: 12/29/21, 1/4/23   - PRP OD: 1/26/2022, 12/14/22   - Most recent YAIR OD: 3/23/22, 4/27/22, 6/22/22  - Most recent YAIR OS: 3/2/22, 4/6/22, 5/11/22.  - Most recent SHARON OD: 7/27/22, 8/24/22, 9/28/22, 11/30/22,  current series:  4/5/2023, 5/24/23, 7/19/23, 8/23/23  - Most recent SHARON OS: 7/15/22, 8/10/22, 9/7/22, 11/16/22,     current series:  5/11/2023, 6/28/23, 8/16/23  - 3/10/23: reviewed IVFA with Dr. Hernández. Patient likely has macular ischemia limiting vision in both eyes. Few areas of RANDI and venous beading visible OU. No obvious NVE; however, IVFA does not show areas farther in periphery. Can try additional injections again to see if vision truly does not improve beyond 20/200, though it is unlikely that macula will become completely dry.   - Patient completed series of 3 Eylea OD but still with significant IRF on 8/16/23 which is even worse on 8/23/23, however much better response OS. S/p SHARON #4 8/23/23. Plan for retina eval/ possible Ozurdex in [place] 10/25/23.  - 10/25/23: with Dr. Schwartz in  retina clinic: It is our opinion that the vision in either eye is limited by macular ischemia and the presence of chronic edema. The left eye FC is decent and with recent CEIOL this supports ischemic argument. Either eye is a candidate for Ozurdex given limited injection responses although the right eye may be first target instead of left. Will start PF QID OU and flurbiprofen (has from post-op) QID OU to asses for steroid response and edema response. At that time instructed to RTC 1mo for repeat DFE, OCTm and consideration ozurdex.  - 01/31/2024: patient lost to follow up. Reports that she was only using a pink top four times daily and a green top four times  "daily for three weeks after 10/24/2023, and then was "told to stop". No documentation of ophthalmology visits after 10/25/23, no showed on 12/01/23. Patient reports forgetting her appointment. Adamant that she has not used drops for at least two months in either eye.  - 3/7/24: now after NVG resolved OS; still with stable 20/200 vision and slightly worsened edema with atrophy.  Will send back to [place] within 1mo for f/u as planned prior to LTFU. RTC here 2 weeks for SHARON OD as nothing has been injected and she has worsening edema and we will prophylax against NVG as has happened in the left. Continue Cosopt BID OU  - 3/27/24: No NV on exam. Will hold off injection at this time OU. Will arrange to have follow up in [place] for ozurdex and evaluation for possible PRP to prevent NV versus if we need to keep doing serial injections while severe ME.  - 3/2/25: LTFU in [place], seen in [facility] general clinic after referral from [facility]. Active pdr both eyes. VA LP OS, IOP 58 OS. No NVI, but does have NVA inferiorly. Pt not in pain. Was seen 1 month ago at [facility] with stable VA OS, IOP 22 OS. D/w Dr. Espinoza, will schedule on next available procedure day for YAIR OD as better seeing eye.   - 5/9/25: seen with Dr. Hernández, agree with treating edema with injections, start with SHARON as patient monocular and poor vision.   - Will need updated IVFA after injections     3. S/p CEIOL OS  - DOS: 9/21/23  - POM1 VA 20/200, no improvement with refraction, same as pre-op VA.   - off drops     4. Combined cataract OD  - likely VS however given severe DME will wait until better control prior to attempting surgery   -----------------------------------    RTC as scheduled 5/14/25 for SHARON OD  "

## 2025-06-17 ENCOUNTER — TELEPHONE (OUTPATIENT)
Dept: OPHTHALMOLOGY | Facility: CLINIC | Age: 65
End: 2025-06-17

## 2025-06-18 ENCOUNTER — CLINICAL SUPPORT (OUTPATIENT)
Dept: OPHTHALMOLOGY | Facility: CLINIC | Age: 65
End: 2025-06-18

## 2025-06-18 VITALS — BODY MASS INDEX: 25.94 KG/M2 | WEIGHT: 137.38 LBS | HEIGHT: 61 IN

## 2025-06-18 DIAGNOSIS — E08.3511: Primary | ICD-10-CM

## 2025-06-18 PROCEDURE — 99214 OFFICE O/P EST MOD 30 MIN: CPT | Mod: PBBFAC,PN

## 2025-06-18 PROCEDURE — 96372 THER/PROPH/DIAG INJ SC/IM: CPT | Mod: PBBFAC,PN

## 2025-06-18 PROCEDURE — 67028 INJECTION EYE DRUG: CPT | Mod: PBBFAC,PN

## 2025-06-18 RX ORDER — ATROPINE SULFATE 10 MG/ML
1 SOLUTION/ DROPS OPHTHALMIC 2 TIMES DAILY
Qty: 15 ML | Refills: 10 | Status: SHIPPED | OUTPATIENT
Start: 2025-06-18

## 2025-06-18 RX ORDER — PREDNISOLONE ACETATE 10 MG/ML
1 SUSPENSION/ DROPS OPHTHALMIC 4 TIMES DAILY
Qty: 15 ML | Refills: 10 | Status: SHIPPED | OUTPATIENT
Start: 2025-06-18 | End: 2026-06-18

## 2025-06-18 RX ADMIN — BALANCED SALT SOLUTION 15 ML: 6.4; .75; .48; .3; 3.9; 1.7 SOLUTION OPHTHALMIC at 10:06

## 2025-06-18 RX ADMIN — AFLIBERCEPT 2 MG: 40 INJECTION, SOLUTION INTRAVITREAL at 10:06

## 2025-06-18 NOTE — PROGRESS NOTES
[facility]    RTC as scheduled 5/14/25 for SHARON OD  Patient stated that she will need a refill on her drops- Pred forte and   brimonidine   Last edited by Gina Monzon MA on 6/18/2025 10:14 AM.          Assessment /Plan     For exam results, see Encounter Report.    Diabetes mellitus due to underlying condition with right eye affected by proliferative retinopathy and macular edema, unspecified whether long term insulin use  -     aflibercept Soln 2 mg    Other orders  -     balanced salt irrigation intra-ocular solution  -     atropine 1% (ISOPTO ATROPINE) 1 % Drop; Place 1 drop into the left eye 2 (two) times a day.  Dispense: 15 mL; Refill: 10  -     prednisoLONE acetate (PRED FORTE) 1 % DrpS; Place 1 drop into the left eye 4 (four) times daily.  Dispense: 15 mL; Refill: 10          OCT Mac   4/2/25:   OD: distorted FC, significant CME   OS: distorted foveal contour, atrophy, significant CME   3/27/24:  (artifact), severe CME stable // , atrophy and exudates centrally, temporal CME worse     5/9/25:   , significant CME with disrupted FC  OS 16, poor scan        Neovascular glaucoma OS  NLP OS  - secondary to proliferative diabetic retinopathy  - Last a1c 9.6 from 12/13/2022 no recent labs  - patient presents 1/31/24 with florid NVI OS and pain and IOP 54 after LTFU for 3mo  - Tap/inject 1/31/24 with Cosopt for weeks  afterwards successfully managed  - LTFU 3/2024 - 4/2025  - 4/2/25: LTFU in [place], seen in [facility] general clinic after [facility] [place]. Active pdr both eyes. VA LP OS, IOP 58 OS. No NVI, but does have NVA inferior. Pt not in pain. Was seen 1 month ago at [facility] with stable VA OS, IOP 22 OS. D/w Dr. Espinoza, due to LP vision and not in pain no acute intervention today, will start on PF QID OS, Atropine BID OS, Cosopt BID OS.  - 5/9/25: patient only on cosopt, IOP 39, not in pain. Vision NLP checked with Dr. Hernández. Comfort care only. PF QID and atropine BID resent to pharmacy.    - 6/18/25: patient comfortable, continue PF QID, atropine BID      3. Stable PDR with mac edema OU  - seen by Dr. Hernández and referred here  - Last A1c in Chart:No results found for: HGBA1C               - per patient a1c ~9, , DM2 for since early 2000s  - PRP OS: 12/29/21, 1/4/23   - PRP OD: 1/26/2022, 12/14/22   - Most recent YAIR OD: 3/23/22, 4/27/22, 6/22/22  - Most recent YAIR OS: 3/2/22, 4/6/22, 5/11/22.  - Most recent SHARON OD: 7/27/22, 8/24/22, 9/28/22, 11/30/22,  current series:  4/5/2023, 5/24/23, 7/19/23, 8/23/23  - Most recent SHARON OS: 7/15/22, 8/10/22, 9/7/22, 11/16/22,     current series:  5/11/2023, 6/28/23, 8/16/23  - 3/10/23: reviewed IVFA with Dr. Hernández. Patient likely has macular ischemia limiting vision in both eyes. Few areas of RANDI and venous beading visible OU. No obvious NVE; however, IVFA does not show areas farther in periphery. Can try additional injections again to see if vision truly does not improve beyond 20/200, though it is unlikely that macula will become completely dry.   - Patient completed series of 3 Eylea OD but still with significant IRF on 8/16/23 which is even worse on 8/23/23, however much better response OS. S/p SHARON #4 8/23/23. Plan for retina eval/ possible Ozurdex in [place] 10/25/23.  - 10/25/23: with Dr. Schwartz in  retina clinic: It is our opinion that the vision in either eye is limited by macular ischemia and the presence of chronic edema. The left eye FC is decent and with recent CEIOL this supports ischemic argument. Either eye is a candidate for Ozurdex given limited injection responses although the right eye may be first target instead of left. Will start PF QID OU and flurbiprofen (has from post-op) QID OU to asses for steroid response and edema response. At that time instructed to RTC 1mo for repeat DFE, OCTm and consideration ozurdex.  - 01/31/2024: patient lost to follow up. Reports that she was only using a pink top four times daily and a green top four times  "daily for three weeks after 10/24/2023, and then was "told to stop". No documentation of ophthalmology visits after 10/25/23, no showed on 12/01/23. Patient reports forgetting her appointment. Adamant that she has not used drops for at least two months in either eye.  - 3/7/24: now after NVG resolved OS; still with stable 20/200 vision and slightly worsened edema with atrophy.  Will send back to [place] within 1mo for f/u as planned prior to LTFU. RTC here 2 weeks for SHARON OD as nothing has been injected and she has worsening edema and we will prophylax against NVG as has happened in the left. Continue Cosopt BID OU  - 3/27/24: No NV on exam. Will hold off injection at this time OU. Will arrange to have follow up in [place] for ozurdex and evaluation for possible PRP to prevent NV versus if we need to keep doing serial injections while severe ME.  - 3/2/25: LTFU in [place], seen in [facility] general clinic after [facility] [place]. Active pdr both eyes. VA LP OS, IOP 58 OS. No NVI, but does have NVA inferiorly. Pt not in pain. Was seen 1 month ago at [facility] with stable VA OS, IOP 22 OS. D/w Dr. Espinoza, will schedule on next available procedure day for YAIR OD as better seeing eye.   - 5/9/25: seen with Dr. Hernández, agree with treating edema with injections, start with SHARON as patient monocular and poor vision.   - 6/18/25: here for SHARON OD #1. R/b/a discussed with patient, consent signed and witnessed. See procedure details below   - Will need updated IVFA after injections     New series SHARON OD: 6/18/25    3. S/p CEIOL OS  - DOS: 9/21/23  - POM1 VA 20/200, no improvement with refraction, same as pre-op VA.   - off drops     4. Combined cataract OD  - likely VS however given severe DME will wait until better control prior to attempting surgery   -----------------------------------    RTC 4wk for OCTm, SHARON OD    Procedure Note: Eylea Intravitreal injection of the RIGHT eye  Pre-and post-procedure diagnosis:  Macular edema    I " have explained the risks, benefits and alternatives, of the procedure in detail. The patient voices understanding and all questions have been answered.  The patient agrees to proceed as planned. Signed consent was obtained, the site was marked, and a timeout was performed. Topical proparacaine was used for anesthesia. Topical Betadine was used for antisepsis. 0.05 cc Eylea injected 4 mm from corneal limbus @ inferotemporal position. The eye was then thoroughly irrigated with saline.  No complications were observed and patient tolerated procedure well and vision was confirmed to be count fingers or better after the injection.  The Patient was educated that mild irritation tonight was normal secondary to topical Betadine use, and was further educated that if significant pain or vision loss in occurred within 2-10 days, they should return immediately to see the ophthalmologist.

## 2025-06-19 ENCOUNTER — TELEPHONE (OUTPATIENT)
Dept: OPHTHALMOLOGY | Facility: CLINIC | Age: 65
End: 2025-06-19

## 2025-07-23 ENCOUNTER — CLINICAL SUPPORT (OUTPATIENT)
Dept: OPHTHALMOLOGY | Facility: CLINIC | Age: 65
End: 2025-07-23

## 2025-07-23 VITALS — BODY MASS INDEX: 27.94 KG/M2 | WEIGHT: 148 LBS | HEIGHT: 61 IN

## 2025-07-23 DIAGNOSIS — H40.52X3 NEOVASCULAR GLAUCOMA OF LEFT EYE, SEVERE STAGE: ICD-10-CM

## 2025-07-23 DIAGNOSIS — E08.3511: Primary | ICD-10-CM

## 2025-07-23 DIAGNOSIS — H25.11 AGE-RELATED NUCLEAR CATARACT OF RIGHT EYE: ICD-10-CM

## 2025-07-23 PROCEDURE — 92134 CPTRZ OPH DX IMG PST SGM RTA: CPT | Mod: PBBFAC,PN | Performed by: OPHTHALMOLOGY

## 2025-07-23 PROCEDURE — 92134 CPTRZ OPH DX IMG PST SGM RTA: CPT | Mod: PBBFAC,PN

## 2025-07-23 PROCEDURE — 67028 INJECTION EYE DRUG: CPT | Mod: PBBFAC,PN

## 2025-07-23 PROCEDURE — 99214 OFFICE O/P EST MOD 30 MIN: CPT | Mod: PBBFAC,PN

## 2025-07-23 RX ORDER — AMOXICILLIN 500 MG/1
500 CAPSULE ORAL 2 TIMES DAILY
COMMUNITY
Start: 2025-07-10

## 2025-07-23 RX ORDER — CLONIDINE HYDROCHLORIDE 0.1 MG/1
0.1 TABLET ORAL EVERY 4 HOURS PRN
COMMUNITY

## 2025-07-23 RX ORDER — SYRING-NEEDL,DISP,INSUL,0.3 ML 30 GX5/16"
SYRINGE, EMPTY DISPOSABLE MISCELLANEOUS
COMMUNITY
Start: 2025-05-27

## 2025-07-23 RX ORDER — LOSARTAN POTASSIUM 25 MG/1
25 TABLET ORAL DAILY
COMMUNITY

## 2025-07-23 RX ADMIN — AFLIBERCEPT 2 MG: 40 INJECTION, SOLUTION INTRAVITREAL at 01:07

## 2025-07-23 RX ADMIN — BALANCED SALT SOLUTION 15 ML: 6.4; .75; .48; .3; 3.9; 1.7 SOLUTION OPHTHALMIC at 01:07

## 2025-07-23 NOTE — PROGRESS NOTES
HPI     NLP OS     Additional comments:  PF QID  Atropine BID            Comments    Here for OCT/SHARON OD.  - Believes vision has gotten a little better since last exam.  - Using drops as directed.   - Does have pain OS that started 4-5 days ago. Relieved with eye drops for   the day.          Last edited by Smitha Dye LPN on 7/23/2025 11:07 AM.          OCT Mac  4/2/25  OD: distorted FC, significant CME  OS: distorted foveal contour, atrophy, significant CME  3/27/24  OD: 247 (artifact), severe CME stable  OS: 225, atrophy and exudates centrally, temporal CME worse   5/9/25  OD: 812, significant CME with disrupted FC  OS: poor scan  7/23/25  OD: 469, significant CME with disrupted FC, improved        Assessment /Plan     For exam results, see Encounter Report.    Diabetes mellitus due to underlying condition with right eye affected by proliferative retinopathy and macular edema, unspecified whether long term insulin use  -     aflibercept Soln 2 mg    Neovascular glaucoma of left eye, severe stage    Age-related nuclear cataract of right eye    Other orders  -     balanced salt irrigation intra-ocular solution        Neovascular glaucoma OS  NLP OS  - secondary to proliferative diabetic retinopathy  - Last a1c 9.6 from 12/13/2022 no recent labs  - patient presents 1/31/24 with florid NVI OS and pain and IOP 54 after LTFU for 3mo  - Tap/inject 1/31/24 with Cosopt for weeks  afterwards successfully managed  - LTFU 3/2024 - 4/2025  - 4/2/25: LTFU in [place], seen in [facility] general clinic after [facility] [place]. Active pdr both eyes. VA LP OS, IOP 58 OS. No NVI, but does have NVA inferior. Pt not in pain. Was seen 1 month ago at [facility] with stable VA OS, IOP 22 OS. D/w Dr. Espinoza, due to LP vision and not in pain no acute intervention today, will start on PF QID OS, Atropine BID OS, Cosopt BID OS.  - 5/9/25: patient only on cosopt, IOP 39, not in pain. Vision NLP checked with Dr. Hernández. [facility]  only. PF QID and atropine BID resent to pharmacy.   - 6/18/25: patient comfortable, continue PF QID, atropine BID   - 7/23/25: Reports mild discomfort OS that is relieved with her eye drops. Currently using PF QID, atropine BID. Will increase atropine to TID.Reports that she still has cosopt at home, okay to start that OS BID as this has helped her discomfort previously.      3. Stable PDR with mac edema OU  - seen by Dr. Hernández and referred here  - Last A1c in Chart:No results found for: HGBA1C               - per patient a1c ~9, , DM2 for since early 2000s  - PRP OS: 12/29/21, 1/4/23   - PRP OD: 1/26/2022, 12/14/22   - Most recent YAIR OD: 3/23/22, 4/27/22, 6/22/22  - Most recent YAIR OS: 3/2/22, 4/6/22, 5/11/22.  - Most recent SHARON OD: 7/27/22, 8/24/22, 9/28/22, 11/30/22,  current series:  4/5/2023, 5/24/23, 7/19/23, 8/23/23  - Most recent SHARON OS: 7/15/22, 8/10/22, 9/7/22, 11/16/22,     current series:  5/11/2023, 6/28/23, 8/16/23  - 3/10/23: reviewed IVFA with Dr. Hernández. Patient likely has macular ischemia limiting vision in both eyes. Few areas of RANDI and venous beading visible OU. No obvious NVE; however, IVFA does not show areas farther in periphery. Can try additional injections again to see if vision truly does not improve beyond 20/200, though it is unlikely that macula will become completely dry.   - Patient completed series of 3 Eylea OD but still with significant IRF on 8/16/23 which is even worse on 8/23/23, however much better response OS. S/p SHARON #4 8/23/23. Plan for retina eval/ possible Ozurdex in [place] 10/25/23.  - 10/25/23: with Dr. Schwartz in  retina clinic: It is our opinion that the vision in either eye is limited by macular ischemia and the presence of chronic edema. The left eye FC is decent and with recent CEIOL this supports ischemic argument. Either eye is a candidate for Ozurdex given limited injection responses although the right eye may be first target instead of left. Will start PF  "QID OU and flurbiprofen (has from post-op) QID OU to asses for steroid response and edema response. At that time instructed to RTC 1mo for repeat DFE, OCTm and consideration ozurdex.  - 01/31/2024: patient lost to follow up. Reports that she was only using a pink top four times daily and a green top four times daily for three weeks after 10/24/2023, and then was "told to stop". No documentation of ophthalmology visits after 10/25/23, no showed on 12/01/23. Patient reports forgetting her appointment. Adamant that she has not used drops for at least two months in either eye.  - 3/7/24: now after NVG resolved OS; still with stable 20/200 vision and slightly worsened edema with atrophy.  Will send back to [place] within 1mo for f/u as planned prior to LTFU. RTC here 2 weeks for SHARON OD as nothing has been injected and she has worsening edema and we will prophylax against NVG as has happened in the left. Continue Cosopt BID OU  - 3/27/24: No NV on exam. Will hold off injection at this time OU. Will arrange to have follow up in [place] for ozurdex and evaluation for possible PRP to prevent NV versus if we need to keep doing serial injections while severe ME.  - 3/2/25: LTFU in [place], seen in [facility] general clinic after [facility]. Active pdr both eyes. VA LP OS, IOP 58 OS. No NVI, but does have NVA inferiorly. Pt not in pain. Was seen 1 month ago at [facility] with stable VA OS, IOP 22 OS. D/w Dr. Espinoza, will schedule on next available procedure day for YAIR OD as better seeing eye.   - 5/9/25: seen with Dr. Hernández, agree with treating edema with injections, start with SHARON as patient monocular and poor vision.   - 6/18/25: here for SHARON OD #1. R/b/a discussed with patient, consent signed and witnessed. See procedure details below Will need updated IVFA after injections.  - 7/23/25: Here for SHARON OD #2. R/b/a discussed with patient, consent signed and witnessed.     New series SHARON OD: 6/18/25, 7/23/25    3. S/p STALIN " OS  - DOS: 9/21/23  - POM1 VA 20/200, no improvement with refraction, same as pre-op VA.   - off drops     4. Combined cataract OD  - likely VS however given severe DME will wait until better control prior to attempting surgery      RTC 4wk for OCTm, SHARON OD    Procedure Note: Eylea Intravitreal injection of the RIGHT eye  Pre-and post-procedure diagnosis:  Macular edema    I have explained the risks, benefits and alternatives, of the procedure in detail. The patient voices understanding and all questions have been answered.  The patient agrees to proceed as planned. Signed consent was obtained, the site was marked, and a timeout was performed. Topical proparacaine was used for anesthesia. Topical Betadine was used for antisepsis. 0.05 cc Eylea injected 4 mm from corneal limbus @ inferotemporal position. The eye was then thoroughly irrigated with saline.  No complications were observed and patient tolerated procedure well and vision was confirmed to be count fingers or better after the injection.  The Patient was educated that mild irritation tonight was normal secondary to topical Betadine use, and was further educated that if significant pain or vision loss in occurred within 2-10 days, they should return immediately to see the ophthalmologist.

## 2025-07-24 PROBLEM — H40.52X3 NEOVASCULAR GLAUCOMA OF LEFT EYE, SEVERE STAGE: Status: ACTIVE | Noted: 2025-07-24

## 2025-08-20 ENCOUNTER — CLINICAL SUPPORT (OUTPATIENT)
Dept: OPHTHALMOLOGY | Facility: CLINIC | Age: 65
End: 2025-08-20

## 2025-08-20 VITALS — HEIGHT: 61 IN | BODY MASS INDEX: 27.93 KG/M2 | WEIGHT: 147.94 LBS

## 2025-08-20 DIAGNOSIS — E08.3511: Primary | ICD-10-CM

## 2025-08-20 DIAGNOSIS — H40.52X3 NEOVASCULAR GLAUCOMA OF LEFT EYE, SEVERE STAGE: ICD-10-CM

## 2025-08-20 DIAGNOSIS — H25.11 AGE-RELATED NUCLEAR CATARACT OF RIGHT EYE: ICD-10-CM

## 2025-08-20 PROCEDURE — 67028 INJECTION EYE DRUG: CPT | Mod: PBBFAC,PN,RT

## 2025-08-20 PROCEDURE — 92134 CPTRZ OPH DX IMG PST SGM RTA: CPT | Mod: PBBFAC,PN

## 2025-08-20 PROCEDURE — 99214 OFFICE O/P EST MOD 30 MIN: CPT | Mod: PBBFAC,PN,25

## 2025-08-20 PROCEDURE — 92134 CPTRZ OPH DX IMG PST SGM RTA: CPT | Mod: PBBFAC,PN | Performed by: OPHTHALMOLOGY

## 2025-08-20 RX ADMIN — AFLIBERCEPT 2 MG: 40 INJECTION, SOLUTION INTRAVITREAL at 12:08

## 2025-08-20 RX ADMIN — BALANCED SALT SOLUTION 15 ML: 6.4; .75; .48; .3; 3.9; 1.7 SOLUTION OPHTHALMIC at 12:08

## 2025-08-21 ENCOUNTER — TELEPHONE (OUTPATIENT)
Dept: OPHTHALMOLOGY | Facility: CLINIC | Age: 65
End: 2025-08-21

## (undated) DEVICE — GLOVE SENSICARE PI GRN 7

## (undated) DEVICE — DRESSING TRANS 2X2 TEGADERM

## (undated) DEVICE — NDL MAGELLAN SAFETY 18G 1.5IN

## (undated) DEVICE — PROTECTOR ONE-STEP ARM REG

## (undated) DEVICE — GLOVE SENSICARE PI GRN 8

## (undated) DEVICE — SLEEVE OPTH 2.4MM

## (undated) DEVICE — 2.5MM SLIT OPHTH KNIFE

## (undated) DEVICE — PACK CATARACT BAUSCH AND LOMB

## (undated) DEVICE — KNIFE SURG LASEREDGE + 1.1MM

## (undated) DEVICE — SYR 3CC LUER LOC

## (undated) DEVICE — TAPE CURAD PAPER ADH 1INX10YD

## (undated) DEVICE — GLOVE SENSICARE PI GRN 6.5

## (undated) DEVICE — MICRO-COAXIAL PHACO NEEDLE, ANGLED

## (undated) DEVICE — BETADINE OPTHALMIC SOL 5% 30ML

## (undated) DEVICE — HANDPIECE OPTH 1.8MM

## (undated) DEVICE — NDL FLTR 5MCRN BLNT TIP 18GX1

## (undated) DEVICE — PACK FLUIDICS ADAPTIVE BASIC